# Patient Record
Sex: FEMALE | Race: WHITE | HISPANIC OR LATINO
[De-identification: names, ages, dates, MRNs, and addresses within clinical notes are randomized per-mention and may not be internally consistent; named-entity substitution may affect disease eponyms.]

---

## 2017-02-17 ENCOUNTER — APPOINTMENT (OUTPATIENT)
Dept: PULMONOLOGY | Facility: CLINIC | Age: 62
End: 2017-02-17

## 2017-03-20 ENCOUNTER — APPOINTMENT (OUTPATIENT)
Dept: PULMONOLOGY | Facility: CLINIC | Age: 62
End: 2017-03-20

## 2017-09-21 ENCOUNTER — APPOINTMENT (OUTPATIENT)
Dept: PULMONOLOGY | Facility: CLINIC | Age: 62
End: 2017-09-21
Payer: COMMERCIAL

## 2017-09-21 PROCEDURE — G0008: CPT

## 2017-09-21 PROCEDURE — 90670 PCV13 VACCINE IM: CPT

## 2017-09-21 PROCEDURE — 90472 IMMUNIZATION ADMIN EACH ADD: CPT

## 2017-09-21 PROCEDURE — 99214 OFFICE O/P EST MOD 30 MIN: CPT | Mod: 25

## 2018-02-02 ENCOUNTER — APPOINTMENT (OUTPATIENT)
Dept: PULMONOLOGY | Facility: CLINIC | Age: 63
End: 2018-02-02
Payer: COMMERCIAL

## 2018-02-02 PROCEDURE — 99213 OFFICE O/P EST LOW 20 MIN: CPT | Mod: 25

## 2018-04-24 ENCOUNTER — APPOINTMENT (OUTPATIENT)
Dept: PULMONOLOGY | Facility: CLINIC | Age: 63
End: 2018-04-24
Payer: COMMERCIAL

## 2018-04-24 PROCEDURE — 99214 OFFICE O/P EST MOD 30 MIN: CPT | Mod: 25

## 2018-05-03 ENCOUNTER — APPOINTMENT (OUTPATIENT)
Dept: PULMONOLOGY | Facility: CLINIC | Age: 63
End: 2018-05-03
Payer: COMMERCIAL

## 2018-05-03 PROCEDURE — 94010 BREATHING CAPACITY TEST: CPT

## 2018-05-03 PROCEDURE — 94729 DIFFUSING CAPACITY: CPT

## 2018-05-03 PROCEDURE — 94727 GAS DIL/WSHOT DETER LNG VOL: CPT

## 2018-09-13 ENCOUNTER — APPOINTMENT (OUTPATIENT)
Dept: PULMONOLOGY | Facility: CLINIC | Age: 63
End: 2018-09-13
Payer: COMMERCIAL

## 2018-09-13 VITALS
SYSTOLIC BLOOD PRESSURE: 110 MMHG | DIASTOLIC BLOOD PRESSURE: 70 MMHG | HEART RATE: 76 BPM | TEMPERATURE: 98.2 F | OXYGEN SATURATION: 98 % | HEIGHT: 61 IN | WEIGHT: 117 LBS | BODY MASS INDEX: 22.09 KG/M2

## 2018-09-13 DIAGNOSIS — Z86.018 PERSONAL HISTORY OF OTHER BENIGN NEOPLASM: ICD-10-CM

## 2018-09-13 DIAGNOSIS — Z87.01 PERSONAL HISTORY OF PNEUMONIA (RECURRENT): ICD-10-CM

## 2018-09-13 DIAGNOSIS — Z80.3 FAMILY HISTORY OF MALIGNANT NEOPLASM OF BREAST: ICD-10-CM

## 2018-09-13 DIAGNOSIS — Z80.1 FAMILY HISTORY OF MALIGNANT NEOPLASM OF TRACHEA, BRONCHUS AND LUNG: ICD-10-CM

## 2018-09-13 DIAGNOSIS — Z78.9 OTHER SPECIFIED HEALTH STATUS: ICD-10-CM

## 2018-09-13 DIAGNOSIS — Z80.8 FAMILY HISTORY OF MALIGNANT NEOPLASM OF OTHER ORGANS OR SYSTEMS: ICD-10-CM

## 2018-09-13 DIAGNOSIS — Z82.3 FAMILY HISTORY OF STROKE: ICD-10-CM

## 2018-09-13 DIAGNOSIS — Z82.49 FAMILY HISTORY OF ISCHEMIC HEART DISEASE AND OTHER DISEASES OF THE CIRCULATORY SYSTEM: ICD-10-CM

## 2018-09-13 PROCEDURE — 99214 OFFICE O/P EST MOD 30 MIN: CPT

## 2018-09-25 ENCOUNTER — APPOINTMENT (OUTPATIENT)
Dept: CT IMAGING | Facility: HOSPITAL | Age: 63
End: 2018-09-25
Payer: COMMERCIAL

## 2018-09-25 ENCOUNTER — OUTPATIENT (OUTPATIENT)
Dept: OUTPATIENT SERVICES | Facility: HOSPITAL | Age: 63
LOS: 1 days | End: 2018-09-25
Payer: COMMERCIAL

## 2018-09-25 PROCEDURE — 71250 CT THORAX DX C-: CPT | Mod: 26

## 2018-09-25 PROCEDURE — 71250 CT THORAX DX C-: CPT

## 2018-10-31 ENCOUNTER — APPOINTMENT (OUTPATIENT)
Dept: PULMONOLOGY | Facility: CLINIC | Age: 63
End: 2018-10-31
Payer: COMMERCIAL

## 2018-10-31 VITALS
WEIGHT: 118 LBS | OXYGEN SATURATION: 98 % | HEART RATE: 73 BPM | SYSTOLIC BLOOD PRESSURE: 120 MMHG | DIASTOLIC BLOOD PRESSURE: 84 MMHG | BODY MASS INDEX: 22.28 KG/M2 | HEIGHT: 61 IN | TEMPERATURE: 98.6 F

## 2018-10-31 PROCEDURE — 99214 OFFICE O/P EST MOD 30 MIN: CPT

## 2018-11-30 ENCOUNTER — APPOINTMENT (OUTPATIENT)
Dept: PULMONOLOGY | Facility: CLINIC | Age: 63
End: 2018-11-30
Payer: COMMERCIAL

## 2018-11-30 PROCEDURE — 94729 DIFFUSING CAPACITY: CPT

## 2018-11-30 PROCEDURE — 94010 BREATHING CAPACITY TEST: CPT

## 2019-05-08 ENCOUNTER — APPOINTMENT (OUTPATIENT)
Dept: PULMONOLOGY | Facility: CLINIC | Age: 64
End: 2019-05-08
Payer: COMMERCIAL

## 2019-05-08 VITALS
DIASTOLIC BLOOD PRESSURE: 70 MMHG | BODY MASS INDEX: 22.09 KG/M2 | WEIGHT: 117 LBS | TEMPERATURE: 98.1 F | HEIGHT: 61 IN | OXYGEN SATURATION: 98 % | HEART RATE: 78 BPM | SYSTOLIC BLOOD PRESSURE: 102 MMHG

## 2019-05-08 PROCEDURE — 99214 OFFICE O/P EST MOD 30 MIN: CPT

## 2019-05-08 NOTE — HISTORY OF PRESENT ILLNESS
[Wheezing] : denies wheezing [Fever] : denies fever [Regional Soft Tissue Swelling Both Lower Extremities] : denies lower extremity edema [Chest Pain Or Discomfort] : denies chest pain [Class I - No Symptoms and No Limitations] : I [3  -  Moderate] : 3, moderate [Witnessed Apneas] : witnessed sleep apnea [Stable] : are stable [Difficulty Breathing During Exertion] : stable dyspnea on exertion [Feelings Of Weakness On Exertion] : stable exercise intolerance [Cough] : stable coughing [Snoring] : Snoring [Daytime Somnolence] : daytime somnolence [Unintentional Sleep while Active] : unintentional sleep while active [Unintentional Sleep While Inactive] : unintentional sleep while inactive [Wt Gain ___ Lbs] : no recent weight gain [Wt Loss ___ Lbs] : no recent weight loss [Oxygen] : the patient uses no supplemental oxygen [FreeTextEntry1] : She has complaints of fatigue over a month. She continue to cough and with intermittent productive intermittent cough with some green sputum at times.  She continues to have SOB intermittently and fatigued.  Mostly SOB with climbing stairs and ok with doing cardio at the gym. She has been exhausted and sleeping more than normal.  She is not coughing at night any more which is keeping up.  Gets 7 - 3 hours a night of sleep.  Her hair is falling out more.  \par \par Continues with post nasal gtt and sinus congestion. She was seen by ENT was told she had infection was given antibiotics. Seen by Dr. Queen on [address] [place] [address] and had follow up which cleared infection. Denies any fever.  States her  stays that she tends to hold hold her breath at night and snores at nighttime.  She is waking herself up snoring.\par \par She was sen by PCP had full blood work which is normal.

## 2019-05-08 NOTE — REVIEW OF SYSTEMS
[Nasal Congestion] : nasal congestion [Postnasal Drip] : postnasal drip [Cough] : cough [Dyspnea] : dyspnea [Negative] : Pulmonary Hypertension

## 2019-05-08 NOTE — REASON FOR VISIT
[Follow-Up] : a follow-up visit [Shortness of Breath] : shortness of Breath [FreeTextEntry2] : Follow up

## 2019-05-08 NOTE — PHYSICAL EXAM
[General Appearance - Well Developed] : well developed [Normal Appearance] : normal appearance [Well Groomed] : well groomed [No Deformities] : no deformities [General Appearance - Well Nourished] : well nourished [General Appearance - In No Acute Distress] : no acute distress [Normal Conjunctiva] : the conjunctiva exhibited no abnormalities [Eyelids - No Xanthelasma] : the eyelids demonstrated no xanthelasmas [Normal Oropharynx] : normal oropharynx [Neck Appearance] : the appearance of the neck was normal [Neck Cervical Mass (___cm)] : no neck mass was observed [Jugular Venous Distention Increased] : there was no jugular-venous distention [Thyroid Diffuse Enlargement] : the thyroid was not enlarged [Thyroid Nodule] : there were no palpable thyroid nodules [Heart Rate And Rhythm] : heart rate and rhythm were normal [Heart Sounds] : normal S1 and S2 [Murmurs] : no murmurs present [Respiration, Rhythm And Depth] : normal respiratory rhythm and effort [Exaggerated Use Of Accessory Muscles For Inspiration] : no accessory muscle use [Auscultation Breath Sounds / Voice Sounds] : lungs were clear to auscultation bilaterally [Abdomen Soft] : soft [Abdomen Tenderness] : non-tender [Abdomen Mass (___ Cm)] : no abdominal mass palpated [Abnormal Walk] : normal gait [Gait - Sufficient For Exercise Testing] : the gait was sufficient for exercise testing [Nail Clubbing] : no clubbing of the fingernails [Cyanosis, Localized] : no localized cyanosis [Petechial Hemorrhages (___cm)] : no petechial hemorrhages [Skin Color & Pigmentation] : normal skin color and pigmentation [] : no rash [No Venous Stasis] : no venous stasis [Skin Lesions] : no skin lesions [No Skin Ulcers] : no skin ulcer [No Xanthoma] : no  xanthoma was observed [Deep Tendon Reflexes (DTR)] : deep tendon reflexes were 2+ and symmetric [Sensation] : the sensory exam was normal to light touch and pinprick [No Focal Deficits] : no focal deficits

## 2019-05-08 NOTE — ASSESSMENT
[FreeTextEntry1] : Pulmonary nodules\par \par I discussed the last CT scan from march 2018 and with negative PET scan. The nodules are more of tree in bud consistent with airway disease and bronchiectasis, with mild increase since prior imaging.  She is high risk for lung cancer due to smoking history (Quit 25 years ago, 15 years 0.5 ppd)  and family history of lung CA. Follow up CT scan in one year Sept 2019.\par \par bronchiectasis \par \par Problem related to her previous infection. There is no evidence of exacerbation at this time.  Discussed Bronchiectasis in detail with pt, she is asymptomatic at this time no worsening cough, appetite is good and recently been gaining weight.  No need for bronch at this time or treatment.\par I advised the patient the need for immunization and she will follow her primary physician. She is to exercise to improve her lung condition.  Lung exam unremarkable and oxygen saturation WNL.  Follow on repeat CT scan.  She is due for her PNA 23 and Flu she will get with her PCP. \par \par Post nasal gtt \par \par Continue with saline spray as needed.  Cough most likely related to post nasal gtt at this time.\par \par snoring/fatigue\par \par I discussed the short and long term health effect of the obstructive seep apnea with the patient. These effects include, but not limited to, uncontrolled hypertension, CAD, arrhythmias, sudden death, CVA, and pulmonary hypertension. I advised the patient to avoid sedatives, narcotics, driving, and sleeping pills in the meantime. I discussed the therapeutic options including but not limited to CPAP, surgery, and oral appliance. Further recommendations will follow after sleep study.\par

## 2019-05-21 ENCOUNTER — OTHER (OUTPATIENT)
Age: 64
End: 2019-05-21

## 2019-05-29 ENCOUNTER — OUTPATIENT (OUTPATIENT)
Dept: OUTPATIENT SERVICES | Facility: HOSPITAL | Age: 64
LOS: 1 days | End: 2019-05-29
Payer: COMMERCIAL

## 2019-05-29 ENCOUNTER — APPOINTMENT (OUTPATIENT)
Dept: SLEEP CENTER | Facility: HOME HEALTH | Age: 64
End: 2019-05-29
Payer: COMMERCIAL

## 2019-05-29 PROCEDURE — 95800 SLP STDY UNATTENDED: CPT | Mod: 26

## 2019-05-29 PROCEDURE — 95800 SLP STDY UNATTENDED: CPT

## 2019-05-30 DIAGNOSIS — G47.33 OBSTRUCTIVE SLEEP APNEA (ADULT) (PEDIATRIC): ICD-10-CM

## 2019-08-08 ENCOUNTER — APPOINTMENT (OUTPATIENT)
Dept: PULMONOLOGY | Facility: CLINIC | Age: 64
End: 2019-08-08
Payer: COMMERCIAL

## 2019-08-08 VITALS
SYSTOLIC BLOOD PRESSURE: 100 MMHG | OXYGEN SATURATION: 98 % | DIASTOLIC BLOOD PRESSURE: 70 MMHG | HEART RATE: 79 BPM | BODY MASS INDEX: 21.9 KG/M2 | HEIGHT: 61 IN | RESPIRATION RATE: 12 BRPM | TEMPERATURE: 99.1 F | WEIGHT: 116 LBS

## 2019-08-08 PROCEDURE — 99214 OFFICE O/P EST MOD 30 MIN: CPT

## 2019-08-08 NOTE — HISTORY OF PRESENT ILLNESS
[Stable] : are stable [Difficulty Breathing During Exertion] : stable dyspnea on exertion [Feelings Of Weakness On Exertion] : stable exercise intolerance [Cough] : stable coughing [Regional Soft Tissue Swelling Both Lower Extremities] : denies lower extremity edema [Wheezing] : denies wheezing [Chest Pain Or Discomfort] : denies chest pain [Fever] : denies fever [3  -  Moderate] : 3, moderate [Class I - No Symptoms and No Limitations] : I [Snoring] : snoring [Witnessed Apneas] : witnessed sleep apnea [Daytime Somnolence] : daytime somnolence [Unintentional Sleep while Active] : unintentional sleep while active [Unintentional Sleep While Inactive] : unintentional sleep while inactive [Wt Gain ___ Lbs] : no recent weight gain [Wt Loss ___ Lbs] : no recent weight loss [Oxygen] : the patient uses no supplemental oxygen [FreeTextEntry1] : 5/29/2019\par \par She has complaints of fatigue over a month. She continue to cough and with intermittent productive intermittent cough with some green sputum at times.  She continues to have SOB intermittently and fatigued.  Mostly SOB with climbing stairs and ok with doing cardio at the gym. She has been exhausted and sleeping more than normal.  She is not coughing at night any more which is keeping up.  Gets 7 - 3 hours a night of sleep.  Her hair is falling out more.  \par \par Continues with post nasal gtt and sinus congestion. She was seen by ENT was told she had infection was given antibiotics. Seen by Dr. Queen on [address] [place] [address] and had follow up which cleared infection. Denies any fever.  States her  stays that she tends to hold hold her breath at night and snores at nighttime.  She is waking herself up snoring.\par \par She was sen by PCP had full blood work which is normal.\par \par 8/8/2019\par \par Pt is concerned regarding her CT scan changes.  About 4 weeks ago she was sleeping and she woke up gagging with hemoptysis.  She was coughing up a lot of bright red blood about the size of a tablespoon.  This is the first time she has ever coughed up any blood.  \par \par Otherwise she has had intermittent SOB that has not gotten any worse.  She coughing worse at night when lying down and green sputum last time.  Denies fevers, weight loss or change in appetite. \par \par She had her air tested in her apartment.

## 2019-08-08 NOTE — DISCUSSION/SUMMARY
[FreeTextEntry1] : \par EXAM: CT CHEST \par \par PROCEDURE DATE: 09/25/2018 \par \par \par \par INTERPRETATION: CT Chest without intravenous contrast \par \par INDICATION: Bronchiectasis with increased cough. Tree-in-bud pulmonary \par nodules. \par \par Technique: CT scan of chest performed from lung apices through lung bases. 1 \par mm thick axial images, axial MIPS, and sagittal and coronal reformatted \par images were produced. Intravenous contrast was not administered, as ordered. \par \par COMPARISON: PET/CT 8/12/2016. \par \par FINDINGS: \par \par Lungs and large airways: There has been mild interval progression in \par bronchiectasis, atelectasis, tree-in-bud nodules and mucus plugging in the \par lingula. Similar but less advanced findings are present in the remaining \par lobes. Findings are most consistent with small and large airway \par inflammation, likely infectious such as DEDRICK. There is mild biapical pleural \par parenchymal scarring. \par \par Lymph nodes: No thoracic lymphadenopathy. \par \par Mediastinum: Heart size is normal. No pericardial effusion. \par \par Vessels: Small coronary plaque. \par \par Chest wall and lower neck: Normal. \par \par Upper abdomen: Unremarkable. \par \par Bones: Mild degenerative changes. \par \par \par IMPRESSION: \par \par \par 1. Mild interval progression in lingular bronchiectasis, tree-in-bud \par nodules, mucous plugging and atelectasis/scarring. \par 2. Similar but less advanced findings are present in the remaining lobes. \par Treated differential diagnosis includes pulmonary nontuberculous \par mycobacterial infection. Marked discopathy correlation. \par \par \par

## 2019-08-08 NOTE — ASSESSMENT
[FreeTextEntry1] : Pulmonary nodules\par \par I discussed the last CT scan from march 2018 and with negative PET scan. The nodules are more of tree in bud consistent with airway disease and bronchiectasis, with mild increase since prior imaging.  She is high risk for lung cancer due to smoking history (Quit 25 years ago, 15 years 0.5 ppd)  and family history of lung CA. Follow up CT scan at this time due to hemoptysis and evaluate nodules\par \par bronchiectasis \par \par Problem related to her previous infection.Discussed Bronchiectasis in detail with pt, she is asymptomatic at this time no worsening cough, appetite is good and recently been gaining weight.  Pt with hemoptysis follow with CT scan at this time and evaluate for possible need for bronch at this time or treatment.\par \par I advised the patient the need for immunization and she will follow her primary physician. She is to exercise to improve her lung condition.  Lung exam unremarkable and oxygen saturation WNL.  Follow on repeat CT scan.  She is due for her PNA 23 and Flu she will get with her PCP. \par \par Post nasal gtt \par \par Continue with saline spray as needed.   She is followed by ENT Dr. Queen on [address]. \par \par snoring/fatigue\par \par Reviewed the sleep study without any significant sleep disorder was observed by PHI criteria.\par

## 2019-08-08 NOTE — PHYSICAL EXAM
[Normal Appearance] : normal appearance [General Appearance - Well Developed] : well developed [Well Groomed] : well groomed [General Appearance - Well Nourished] : well nourished [No Deformities] : no deformities [General Appearance - In No Acute Distress] : no acute distress [Normal Conjunctiva] : the conjunctiva exhibited no abnormalities [Eyelids - No Xanthelasma] : the eyelids demonstrated no xanthelasmas [Normal Oropharynx] : normal oropharynx [Neck Appearance] : the appearance of the neck was normal [Jugular Venous Distention Increased] : there was no jugular-venous distention [Neck Cervical Mass (___cm)] : no neck mass was observed [Thyroid Nodule] : there were no palpable thyroid nodules [Thyroid Diffuse Enlargement] : the thyroid was not enlarged [Heart Rate And Rhythm] : heart rate and rhythm were normal [Murmurs] : no murmurs present [Heart Sounds] : normal S1 and S2 [Exaggerated Use Of Accessory Muscles For Inspiration] : no accessory muscle use [Respiration, Rhythm And Depth] : normal respiratory rhythm and effort [Auscultation Breath Sounds / Voice Sounds] : lungs were clear to auscultation bilaterally [Abdomen Tenderness] : non-tender [Abdomen Soft] : soft [Abdomen Mass (___ Cm)] : no abdominal mass palpated [Abnormal Walk] : normal gait [Gait - Sufficient For Exercise Testing] : the gait was sufficient for exercise testing [Nail Clubbing] : no clubbing of the fingernails [Cyanosis, Localized] : no localized cyanosis [Petechial Hemorrhages (___cm)] : no petechial hemorrhages [] : no ischemic changes [Skin Color & Pigmentation] : normal skin color and pigmentation [Skin Lesions] : no skin lesions [No Venous Stasis] : no venous stasis [No Skin Ulcers] : no skin ulcer [No Xanthoma] : no  xanthoma was observed [Deep Tendon Reflexes (DTR)] : deep tendon reflexes were 2+ and symmetric [Sensation] : the sensory exam was normal to light touch and pinprick [No Focal Deficits] : no focal deficits

## 2019-08-08 NOTE — REVIEW OF SYSTEMS
[Postnasal Drip] : postnasal drip [Nasal Congestion] : nasal congestion [Cough] : cough [Dyspnea] : dyspnea [Negative] : Sleep Disorder

## 2019-09-10 ENCOUNTER — FORM ENCOUNTER (OUTPATIENT)
Age: 64
End: 2019-09-10

## 2019-09-11 ENCOUNTER — OUTPATIENT (OUTPATIENT)
Dept: OUTPATIENT SERVICES | Facility: HOSPITAL | Age: 64
LOS: 1 days | End: 2019-09-11
Payer: COMMERCIAL

## 2019-09-11 ENCOUNTER — APPOINTMENT (OUTPATIENT)
Dept: CT IMAGING | Facility: HOSPITAL | Age: 64
End: 2019-09-11
Payer: COMMERCIAL

## 2019-09-11 PROCEDURE — 71250 CT THORAX DX C-: CPT

## 2019-09-11 PROCEDURE — 71250 CT THORAX DX C-: CPT | Mod: 26

## 2019-09-24 ENCOUNTER — MED ADMIN CHARGE (OUTPATIENT)
Age: 64
End: 2019-09-24

## 2019-09-24 ENCOUNTER — APPOINTMENT (OUTPATIENT)
Dept: PULMONOLOGY | Facility: CLINIC | Age: 64
End: 2019-09-24
Payer: COMMERCIAL

## 2019-09-24 VITALS
OXYGEN SATURATION: 97 % | WEIGHT: 115 LBS | BODY MASS INDEX: 21.71 KG/M2 | SYSTOLIC BLOOD PRESSURE: 116 MMHG | TEMPERATURE: 98 F | HEIGHT: 61 IN | DIASTOLIC BLOOD PRESSURE: 80 MMHG | HEART RATE: 74 BPM

## 2019-09-24 PROCEDURE — 90686 IIV4 VACC NO PRSV 0.5 ML IM: CPT

## 2019-09-24 PROCEDURE — 99214 OFFICE O/P EST MOD 30 MIN: CPT | Mod: 25

## 2019-09-24 PROCEDURE — G0008: CPT

## 2019-09-24 RX ORDER — MUCUS CLEARING DEVICE
EACH MISCELLANEOUS
Qty: 1 | Refills: 0 | Status: ACTIVE | COMMUNITY
Start: 2019-09-24 | End: 1900-01-01

## 2019-09-24 NOTE — REASON FOR VISIT
[Follow-Up] : a follow-up visit [FreeTextEntry2] : Follow up  [Shortness of Breath] : shortness of Breath

## 2019-09-24 NOTE — HISTORY OF PRESENT ILLNESS
[Stable] : are stable [Feelings Of Weakness On Exertion] : stable exercise intolerance [Difficulty Breathing During Exertion] : stable dyspnea on exertion [Wheezing] : denies wheezing [Cough] : stable coughing [Regional Soft Tissue Swelling Both Lower Extremities] : denies lower extremity edema [Chest Pain Or Discomfort] : denies chest pain [Fever] : denies fever [Wt Gain ___ Lbs] : no recent weight gain [Wt Loss ___ Lbs] : no recent weight loss [Oxygen] : the patient uses no supplemental oxygen [3  -  Moderate] : 3, moderate [Class I - No Symptoms and No Limitations] : I [Witnessed Apneas] : witnessed sleep apnea [Snoring] : snoring [Daytime Somnolence] : daytime somnolence [Unintentional Sleep while Active] : unintentional sleep while active [Unintentional Sleep While Inactive] : unintentional sleep while inactive [FreeTextEntry1] : she is following on the CT scan of chest and she is doing well.  No wheezing

## 2019-09-24 NOTE — PHYSICAL EXAM
[General Appearance - Well Developed] : well developed [Normal Appearance] : normal appearance [Well Groomed] : well groomed [General Appearance - Well Nourished] : well nourished [No Deformities] : no deformities [Normal Conjunctiva] : the conjunctiva exhibited no abnormalities [General Appearance - In No Acute Distress] : no acute distress [Eyelids - No Xanthelasma] : the eyelids demonstrated no xanthelasmas [Neck Appearance] : the appearance of the neck was normal [Normal Oropharynx] : normal oropharynx [Neck Cervical Mass (___cm)] : no neck mass was observed [Jugular Venous Distention Increased] : there was no jugular-venous distention [Thyroid Diffuse Enlargement] : the thyroid was not enlarged [Thyroid Nodule] : there were no palpable thyroid nodules [Heart Sounds] : normal S1 and S2 [Heart Rate And Rhythm] : heart rate and rhythm were normal [Murmurs] : no murmurs present [Exaggerated Use Of Accessory Muscles For Inspiration] : no accessory muscle use [Respiration, Rhythm And Depth] : normal respiratory rhythm and effort [Auscultation Breath Sounds / Voice Sounds] : lungs were clear to auscultation bilaterally [Abdomen Soft] : soft [Abdomen Tenderness] : non-tender [Abnormal Walk] : normal gait [Abdomen Mass (___ Cm)] : no abdominal mass palpated [Nail Clubbing] : no clubbing of the fingernails [Gait - Sufficient For Exercise Testing] : the gait was sufficient for exercise testing [Cyanosis, Localized] : no localized cyanosis [Petechial Hemorrhages (___cm)] : no petechial hemorrhages [Skin Color & Pigmentation] : normal skin color and pigmentation [] : no rash [No Venous Stasis] : no venous stasis [Skin Lesions] : no skin lesions [No Xanthoma] : no  xanthoma was observed [No Skin Ulcers] : no skin ulcer [Deep Tendon Reflexes (DTR)] : deep tendon reflexes were 2+ and symmetric [Sensation] : the sensory exam was normal to light touch and pinprick [No Focal Deficits] : no focal deficits

## 2019-09-24 NOTE — ASSESSMENT
[FreeTextEntry1] : Pulmonary nodules\par \par I discussed the case in details with the patient. I reviewed the images with her. There is new wedge-shaped infiltrate in the korey left upper lobe. The picture is more consistent with airway disease rather than neoplastic process. The patient has no evidence of thromboembolic disease. Patient has a symptomatic at this point. Recommendation is to follow with repeat CT scan of the chest in 3 months. I instructed the patient to avoid dehydration and encouraged expectoration of her sputum. Patient was started on Aerobica\par \par bronchiectasis \par \par Problem related to her previous infection.Discussed Bronchiectasis in detail with pt, she is asymptomatic at this time no worsening cough, appetite is good and recently been gaining weight.  Pt with hemoptysis follow with CT scan at this time and evaluate for possible need for bronch at this time or treatment.\par \par I advised the patient the need for immunization and she will follow her primary physician. She is to exercise to improve her lung condition.  Lung exam unremarkable and oxygen saturation WNL.  Follow on repeat CT scan.  She is due for her PNA 23 and Flu she will get with her PCP. \par \par Post nasal gtt \par \par Continue with saline spray as needed.   She is followed by ENT Dr. Queen on [address]. \par \par snoring/fatigue\par \par Reviewed the sleep study without any significant sleep disorder was observed by PHI criteria.\par \par Flu vaccine given in the left deltoid area and tolerated well

## 2019-12-17 ENCOUNTER — APPOINTMENT (OUTPATIENT)
Dept: PULMONOLOGY | Facility: CLINIC | Age: 64
End: 2019-12-17
Payer: COMMERCIAL

## 2019-12-17 VITALS
TEMPERATURE: 98.1 F | DIASTOLIC BLOOD PRESSURE: 64 MMHG | SYSTOLIC BLOOD PRESSURE: 110 MMHG | OXYGEN SATURATION: 98 % | BODY MASS INDEX: 21.9 KG/M2 | WEIGHT: 116 LBS | HEIGHT: 61 IN | HEART RATE: 76 BPM

## 2019-12-17 PROCEDURE — 99214 OFFICE O/P EST MOD 30 MIN: CPT

## 2019-12-17 NOTE — HISTORY OF PRESENT ILLNESS
[Stable] : are stable [Difficulty Breathing During Exertion] : stable dyspnea on exertion [Feelings Of Weakness On Exertion] : stable exercise intolerance [Cough] : stable coughing [Wheezing] : denies wheezing [Regional Soft Tissue Swelling Both Lower Extremities] : denies lower extremity edema [Chest Pain Or Discomfort] : denies chest pain [Fever] : denies fever [3  -  Moderate] : 3, moderate [Class I - No Symptoms and No Limitations] : I [Snoring] : snoring [Witnessed Apneas] : witnessed sleep apnea [Daytime Somnolence] : daytime somnolence [Unintentional Sleep While Inactive] : unintentional sleep while inactive [Unintentional Sleep while Active] : unintentional sleep while active [Wt Gain ___ Lbs] : no recent weight gain [Wt Loss ___ Lbs] : no recent weight loss [Oxygen] : the patient uses no supplemental oxygen [FreeTextEntry1] : She is here for follow up. She had a bad cold and sinus infection up until last weekend.  She want to ENT last week was told had bacterial sinus infection.  She was given cefdinir and medrol dose pack.  She just started Medrol dose pack today and finishing up the antibiotics.  She had blisters in her nose and outside her nose.  She feels improvement with the steroids and antibiotics.  Mucus is becoming out clear now without any bloody noses.   She as a little  SOB with this infection.  However she is walking 3 miles the other day.  She denies any fever. \par \par she is concerned about CT scan follow up on her condition. She feels fatigued more often, sleeps 10 pm to 6 am.  she is sleeping well.  Denies any hemoptysis or  ER visit.  Her appetite is good and not loosing weight. \par \par She has a humidifier, air purifier, steaming, expectorant and using aerobika as needed.

## 2019-12-17 NOTE — PHYSICAL EXAM
[Normal Appearance] : normal appearance [General Appearance - Well Developed] : well developed [Well Groomed] : well groomed [No Deformities] : no deformities [General Appearance - Well Nourished] : well nourished [General Appearance - In No Acute Distress] : no acute distress [Eyelids - No Xanthelasma] : the eyelids demonstrated no xanthelasmas [Normal Conjunctiva] : the conjunctiva exhibited no abnormalities [Normal Oropharynx] : normal oropharynx [Neck Cervical Mass (___cm)] : no neck mass was observed [Neck Appearance] : the appearance of the neck was normal [Thyroid Diffuse Enlargement] : the thyroid was not enlarged [Jugular Venous Distention Increased] : there was no jugular-venous distention [Thyroid Nodule] : there were no palpable thyroid nodules [Heart Rate And Rhythm] : heart rate and rhythm were normal [Heart Sounds] : normal S1 and S2 [Murmurs] : no murmurs present [Respiration, Rhythm And Depth] : normal respiratory rhythm and effort [Exaggerated Use Of Accessory Muscles For Inspiration] : no accessory muscle use [Abdomen Soft] : soft [Auscultation Breath Sounds / Voice Sounds] : lungs were clear to auscultation bilaterally [Abdomen Tenderness] : non-tender [Abdomen Mass (___ Cm)] : no abdominal mass palpated [Abnormal Walk] : normal gait [Gait - Sufficient For Exercise Testing] : the gait was sufficient for exercise testing [Nail Clubbing] : no clubbing of the fingernails [Cyanosis, Localized] : no localized cyanosis [Petechial Hemorrhages (___cm)] : no petechial hemorrhages [] : no rash [Skin Color & Pigmentation] : normal skin color and pigmentation [No Venous Stasis] : no venous stasis [No Skin Ulcers] : no skin ulcer [Skin Lesions] : no skin lesions [No Xanthoma] : no  xanthoma was observed [Deep Tendon Reflexes (DTR)] : deep tendon reflexes were 2+ and symmetric [No Focal Deficits] : no focal deficits [Sensation] : the sensory exam was normal to light touch and pinprick [Erythema] : erythema of the pharynx [FreeTextEntry1] : congestion to right nasal

## 2019-12-17 NOTE — ASSESSMENT
[FreeTextEntry1] : Pulmonary nodules\par \par I discussed the case in details with the patient. I reviewed the images with her. There is new wedge-shaped infiltrate in the korey left upper lobe. The picture is more consistent with airway disease rather than neoplastic process. The patient has no evidence of thromboembolic disease. Patient is not symptomatic at this point. Recommendation is to follow with repeat CT scan of the chest at this time. I instructed the patient to avoid dehydration and encouraged expectoration of her sputum. Patient to continue on Aeriobika.  We discussed follow up with CT scan at this time and pt in agreement.  She is to go for CT scan once her Sinus infection is cleared.  Continue on antibiotics and medrol dose pack.\par \par bronchiectasis \par \par Problem related to her previous infection. Discussed Bronchiectasis in detail with pt, she is asymptomatic at this time no worsening cough, appetite is good and recently been gaining weight.  Pt without any episodes of hemoptysis follow with CT scan at this time and evaluate for possible need for bronch at this time.\par \par I advised the patient the need for immunization.  She is to exercise to improve her lung condition.  Lung exam unremarkable and oxygen saturation WNL.  Follow on repeat CT scan.  She is due for her PNA 23 and Flu up to date.  To given her the PNA 23 next visit once she feels better. \par \par Post nasal gtt \par \par Continue with saline spray as needed.   She is followed by ENT Dr. Queen on [address].  Finish antibiotics and steroids. \par \par snoring/fatigue\par \par Reviewed the sleep study without any significant sleep disorder was observed by PHI criteria.\par \par

## 2020-01-22 ENCOUNTER — FORM ENCOUNTER (OUTPATIENT)
Age: 65
End: 2020-01-22

## 2020-01-23 ENCOUNTER — APPOINTMENT (OUTPATIENT)
Dept: CT IMAGING | Facility: HOSPITAL | Age: 65
End: 2020-01-23
Payer: COMMERCIAL

## 2020-01-23 ENCOUNTER — OUTPATIENT (OUTPATIENT)
Dept: OUTPATIENT SERVICES | Facility: HOSPITAL | Age: 65
LOS: 1 days | End: 2020-01-23
Payer: COMMERCIAL

## 2020-01-23 PROCEDURE — 71250 CT THORAX DX C-: CPT | Mod: 26

## 2020-01-23 PROCEDURE — 71250 CT THORAX DX C-: CPT

## 2020-04-09 NOTE — HISTORY OF PRESENT ILLNESS
[FreeTextEntry1] : pt has been having for the past 3 day a burning sensation in her throat with heat.  Does not feel sore throat.  She has had difficulty breathing It went down to her chest and she is now having difficulty breathing in chest.  Today the breathing is better in the chest. She has cough that is no difficult then usual. She is not having fevers.  I provided her with the number for  [employer] to be tested for COVID and possible streph.  My concern is her upper airway and if she needs steroids. Pt will update me if she gets an appt. with [facility].

## 2020-04-12 ENCOUNTER — APPOINTMENT (OUTPATIENT)
Dept: DISASTER EMERGENCY | Facility: CLINIC | Age: 65
End: 2020-04-12
Payer: COMMERCIAL

## 2020-04-12 VITALS
DIASTOLIC BLOOD PRESSURE: 82 MMHG | OXYGEN SATURATION: 97 % | TEMPERATURE: 98.2 F | SYSTOLIC BLOOD PRESSURE: 128 MMHG | HEART RATE: 81 BPM | RESPIRATION RATE: 18 BRPM

## 2020-04-12 DIAGNOSIS — R68.89 OTHER GENERAL SYMPTOMS AND SIGNS: ICD-10-CM

## 2020-04-12 PROCEDURE — 99202 OFFICE O/P NEW SF 15 MIN: CPT

## 2020-04-12 NOTE — HISTORY OF PRESENT ILLNESS
[] : no dizziness on standing [None Known] : none known [Lung Disease] : lung disease [None] : none [Clear] : clear [Good Air Entry] : good air entry [Speaks in full sentences] : speaks in full sentences [Normal O2 sat at rest] : normal O2 sat at rest [Grossly normal, interacts, not tired or weak] : grossly normal, interacts, not tired or weak [COVID-19 testing ordered and specimen obtained] : COVID-19 testing ordered and specimen obtained [Patient presents to the office today for COVID-19 evaluation and testing.] : Patient presents to the office today for COVID-19 evaluation and testing. [FreeTextEntry1] : Dry cough x 1 week, SOB, chest closing, heat in my chest. She denies other symptoms\par H/O Bronchiectasis, COPD [Age >= 60 years] : age >= 60 years [TextBox_107] : Explained to patient that the test is uncomfortable\par Advised patient to self quarantine if any fever, to remain there until she has no fever x 3 days without anti-fever medication. Then practice social distancing at all times

## 2020-04-13 LAB — SARS-COV-2 N GENE NPH QL NAA+PROBE: NOT DETECTED

## 2020-07-30 ENCOUNTER — APPOINTMENT (OUTPATIENT)
Dept: PULMONOLOGY | Facility: CLINIC | Age: 65
End: 2020-07-30
Payer: COMMERCIAL

## 2020-07-30 VITALS
OXYGEN SATURATION: 97 % | HEART RATE: 90 BPM | TEMPERATURE: 98.2 F | BODY MASS INDEX: 21.71 KG/M2 | HEIGHT: 61 IN | SYSTOLIC BLOOD PRESSURE: 110 MMHG | RESPIRATION RATE: 12 BRPM | WEIGHT: 115 LBS | DIASTOLIC BLOOD PRESSURE: 80 MMHG

## 2020-07-30 PROCEDURE — 99214 OFFICE O/P EST MOD 30 MIN: CPT

## 2020-07-30 NOTE — REVIEW OF SYSTEMS
[Nasal Congestion] : nasal congestion [Postnasal Drip] : postnasal drip [Dyspnea] : dyspnea [Negative] : Sleep Disorder [Cough] : no cough

## 2020-07-30 NOTE — ASSESSMENT
[FreeTextEntry1] : Pulmonary nodules\par \par I discussed the case in details with the patient. I reviewed last CT scan from 1/2020 findings compatible with small large airway inflammation. Interval decrease in size of the subpleural.  No new pulmonary nodules.  I reviewed the images prior to last CT scan and improvement. Recommendation is to follow with repeat CT scan in September 2020. I instructed the patient to avoid dehydration and encouraged expectoration of her sputum. Patient to continue on Aeriobika.  We discussed follow up with CT scan at this time and pt in agreement.  \par \par bronchiectasis \par \par Problem related to her previous infection. Discussed Bronchiectasis in detail with pt, she is asymptomatic at this time no worsening cough, appetite is good and recently been gaining weight.  Pt hemoptysis resolved follow with CT scan in September.\par \par I advised the patient the need for immunization.  She is exercising to improve her lung condition.  Lung exam unremarkable and oxygen saturation WNL.  Follow on repeat CT scan.  She is due for her PNA 23 and Flu up to date.  To given her the PNA 23 next visit with flu vaccine. \par \par Post nasal gtt \par \par Continue with saline spray as needed.   She is followed by ENT Dr. Queen on [address].  \par \par snoring/fatigue\par \par Reviewed the sleep study without any significant sleep disorder was observed by PHI criteria.\par \par Follow up in september\par \par

## 2020-07-30 NOTE — PHYSICAL EXAM
[Normal Appearance] : normal appearance [General Appearance - Well Developed] : well developed [Well Groomed] : well groomed [General Appearance - Well Nourished] : well nourished [No Deformities] : no deformities [General Appearance - In No Acute Distress] : no acute distress [Normal Conjunctiva] : the conjunctiva exhibited no abnormalities [Erythema] : erythema of the pharynx [Eyelids - No Xanthelasma] : the eyelids demonstrated no xanthelasmas [Normal Oropharynx] : normal oropharynx [Neck Cervical Mass (___cm)] : no neck mass was observed [Neck Appearance] : the appearance of the neck was normal [Thyroid Diffuse Enlargement] : the thyroid was not enlarged [Jugular Venous Distention Increased] : there was no jugular-venous distention [Thyroid Nodule] : there were no palpable thyroid nodules [Heart Sounds] : normal S1 and S2 [Murmurs] : no murmurs present [Heart Rate And Rhythm] : heart rate and rhythm were normal [Respiration, Rhythm And Depth] : normal respiratory rhythm and effort [Abdomen Soft] : soft [Abdomen Tenderness] : non-tender [Exaggerated Use Of Accessory Muscles For Inspiration] : no accessory muscle use [Auscultation Breath Sounds / Voice Sounds] : lungs were clear to auscultation bilaterally [Abdomen Mass (___ Cm)] : no abdominal mass palpated [Abnormal Walk] : normal gait [Gait - Sufficient For Exercise Testing] : the gait was sufficient for exercise testing [Cyanosis, Localized] : no localized cyanosis [Petechial Hemorrhages (___cm)] : no petechial hemorrhages [Nail Clubbing] : no clubbing of the fingernails [Skin Color & Pigmentation] : normal skin color and pigmentation [] : no rash [No Venous Stasis] : no venous stasis [Skin Lesions] : no skin lesions [No Skin Ulcers] : no skin ulcer [No Xanthoma] : no  xanthoma was observed [Deep Tendon Reflexes (DTR)] : deep tendon reflexes were 2+ and symmetric [Sensation] : the sensory exam was normal to light touch and pinprick [No Focal Deficits] : no focal deficits [FreeTextEntry1] : congestion to right nasal

## 2020-07-30 NOTE — REASON FOR VISIT
[Shortness of Breath] : shortness of Breath [Follow-Up] : a follow-up visit [Bronchiectasis] : bronchiectasis [FreeTextEntry2] : Follow up

## 2020-07-30 NOTE — HISTORY OF PRESENT ILLNESS
[Stable] : are stable [Difficulty Breathing During Exertion] : stable dyspnea on exertion [Feelings Of Weakness On Exertion] : stable exercise intolerance [Chest Pain Or Discomfort] : denies chest pain [Regional Soft Tissue Swelling Both Lower Extremities] : denies lower extremity edema [Cough] : stable coughing [Wheezing] : denies wheezing [Fever] : denies fever [3  -  Moderate] : 3, moderate [Class I - No Symptoms and No Limitations] : I [Snoring] : snoring [Witnessed Apneas] : witnessed sleep apnea [Daytime Somnolence] : daytime somnolence [Unintentional Sleep while Active] : unintentional sleep while active [Unintentional Sleep While Inactive] : unintentional sleep while inactive [TextBox_4] : She is here for follow up.  She had a episode of hemoptysis of a tables spoon 2 weeks ago.  She is no longer coughing or having nasal congestion.  Her humidifier stopped working  and noticed that her cough improved afterwards.  She clears weekly her humidifier.  She is feeling clear in the mucus.  \par \par She felt SOB today with walking.  \par \par Her PCP retired Dr. Nichols.  \par \par She denies any fevers, wheezing, coughing improved. She is not SOB she is not doing jump robe and weights without SOB.  she is sleeping well.  Denies ER visit.  Her appetite is good and not loosing weight. \par \par She has a humidifier, air purifier, steaming, expectorant and using aerobika as needed.  [Wt Gain ___ Lbs] : no recent weight gain [Wt Loss ___ Lbs] : no recent weight loss [Oxygen] : the patient uses no supplemental oxygen

## 2020-09-11 ENCOUNTER — APPOINTMENT (OUTPATIENT)
Dept: INTERNAL MEDICINE | Facility: CLINIC | Age: 65
End: 2020-09-11
Payer: COMMERCIAL

## 2020-09-11 VITALS
HEART RATE: 76 BPM | WEIGHT: 116 LBS | BODY MASS INDEX: 21.9 KG/M2 | OXYGEN SATURATION: 99 % | SYSTOLIC BLOOD PRESSURE: 123 MMHG | DIASTOLIC BLOOD PRESSURE: 73 MMHG | HEIGHT: 61 IN | TEMPERATURE: 98.7 F

## 2020-09-11 PROCEDURE — 99387 INIT PM E/M NEW PAT 65+ YRS: CPT | Mod: 25

## 2020-09-11 PROCEDURE — 93000 ELECTROCARDIOGRAM COMPLETE: CPT

## 2020-09-11 PROCEDURE — 36415 COLL VENOUS BLD VENIPUNCTURE: CPT

## 2020-09-11 NOTE — PHYSICAL EXAM
[No Acute Distress] : no acute distress [Well Nourished] : well nourished [Well Developed] : well developed [Normal Sclera/Conjunctiva] : normal sclera/conjunctiva [Well-Appearing] : well-appearing [PERRL] : pupils equal round and reactive to light [Normal Oropharynx] : the oropharynx was normal [Normal Outer Ear/Nose] : the outer ears and nose were normal in appearance [EOMI] : extraocular movements intact [No Lymphadenopathy] : no lymphadenopathy [No JVD] : no jugular venous distention [No Respiratory Distress] : no respiratory distress  [Thyroid Normal, No Nodules] : the thyroid was normal and there were no nodules present [Supple] : supple [No Accessory Muscle Use] : no accessory muscle use [Normal Rate] : normal rate  [Clear to Auscultation] : lungs were clear to auscultation bilaterally [Regular Rhythm] : with a regular rhythm [Normal S1, S2] : normal S1 and S2 [No Carotid Bruits] : no carotid bruits [No Murmur] : no murmur heard [No Abdominal Bruit] : a ~M bruit was not heard ~T in the abdomen [No Varicosities] : no varicosities [No Palpable Aorta] : no palpable aorta [No Edema] : there was no peripheral edema [Pedal Pulses Present] : the pedal pulses are present [No Extremity Clubbing/Cyanosis] : no extremity clubbing/cyanosis [Soft] : abdomen soft [Non-distended] : non-distended [No Masses] : no abdominal mass palpated [Non Tender] : non-tender [No HSM] : no HSM [Normal Bowel Sounds] : normal bowel sounds [Normal Posterior Cervical Nodes] : no posterior cervical lymphadenopathy [Normal Anterior Cervical Nodes] : no anterior cervical lymphadenopathy [No CVA Tenderness] : no CVA  tenderness [Grossly Normal Strength/Tone] : grossly normal strength/tone [No Spinal Tenderness] : no spinal tenderness [No Joint Swelling] : no joint swelling [No Rash] : no rash [No Focal Deficits] : no focal deficits [Coordination Grossly Intact] : coordination grossly intact [Normal Insight/Judgement] : insight and judgment were intact [Normal Affect] : the affect was normal [Normal Gait] : normal gait

## 2020-09-11 NOTE — HISTORY OF PRESENT ILLNESS
[FreeTextEntry1] : Interim reviewed;  Some shortness of breath with  exertion [de-identified] : Gets burning sensation at times;  Question of acid reflux;  Not taking any stomach medication; \par No children;\par Dad   heraclio Cancer\par Mom   osteoporosis ; After fall\par Sister CVA\par Another sister osteoporosis\par 2 Brothers;  Alive .

## 2020-09-11 NOTE — ASSESSMENT
[FreeTextEntry1] : Normal examination; Would try antacids regarding feeling in throat; Will obtain EKG and bloods; Also needs bone density ;

## 2020-09-11 NOTE — HEALTH RISK ASSESSMENT
[Good] : ~his/her~  mood as  good [No] : No [No falls in past year] : Patient reported no falls in the past year [0] : 2) Feeling down, depressed, or hopeless: Not at all (0) [Patient reported mammogram was normal] : Patient reported mammogram was normal [Patient reported bone density results were normal] : Patient reported bone density results were normal [Patient reported PAP Smear was normal] : Patient reported PAP Smear was normal [HIV test declined] : HIV test declined [Patient reported colonoscopy was normal] : Patient reported colonoscopy was normal [Employed] : employed [] :  [Hepatitis C test declined] : Hepatitis C test declined [VIG2Mefnc] : 0 [YearQuit] : 1993 [] : No [PapSmearDate] : 07/2018 [MammogramDate] : 07/2019 [ColonoscopyDate] : 02/2017 [BoneDensityComments] : needs repeat [BoneDensityDate] : 05/2015

## 2020-09-14 LAB
25(OH)D3 SERPL-MCNC: 49.6 NG/ML
ALBUMIN SERPL ELPH-MCNC: 4.9 G/DL
ALP BLD-CCNC: 65 U/L
ALT SERPL-CCNC: 20 U/L
ANION GAP SERPL CALC-SCNC: 17 MMOL/L
APPEARANCE: CLEAR
AST SERPL-CCNC: 26 U/L
BACTERIA: NEGATIVE
BASOPHILS # BLD AUTO: 0.02 K/UL
BASOPHILS NFR BLD AUTO: 0.3 %
BILIRUB SERPL-MCNC: 0.2 MG/DL
BILIRUBIN URINE: NEGATIVE
BLOOD URINE: NEGATIVE
BUN SERPL-MCNC: 12 MG/DL
CALCIUM SERPL-MCNC: 9.6 MG/DL
CHLORIDE SERPL-SCNC: 102 MMOL/L
CHOLEST SERPL-MCNC: 214 MG/DL
CHOLEST/HDLC SERPL: 2.9 RATIO
CO2 SERPL-SCNC: 22 MMOL/L
COLOR: COLORLESS
CREAT SERPL-MCNC: 0.63 MG/DL
EOSINOPHIL # BLD AUTO: 0.02 K/UL
EOSINOPHIL NFR BLD AUTO: 0.3 %
ESTIMATED AVERAGE GLUCOSE: 117 MG/DL
GLUCOSE QUALITATIVE U: NEGATIVE
GLUCOSE SERPL-MCNC: 94 MG/DL
HBA1C MFR BLD HPLC: 5.7 %
HCT VFR BLD CALC: 43.5 %
HDLC SERPL-MCNC: 73 MG/DL
HGB BLD-MCNC: 13.4 G/DL
HYALINE CASTS: 0 /LPF
IMM GRANULOCYTES NFR BLD AUTO: 0.2 %
KETONES URINE: NORMAL
LDLC SERPL CALC-MCNC: 122 MG/DL
LEUKOCYTE ESTERASE URINE: ABNORMAL
LYMPHOCYTES # BLD AUTO: 1.73 K/UL
LYMPHOCYTES NFR BLD AUTO: 27.1 %
MAN DIFF?: NORMAL
MCHC RBC-ENTMCNC: 29 PG
MCHC RBC-ENTMCNC: 30.8 GM/DL
MCV RBC AUTO: 94.2 FL
MICROSCOPIC-UA: NORMAL
MONOCYTES # BLD AUTO: 0.57 K/UL
MONOCYTES NFR BLD AUTO: 8.9 %
NEUTROPHILS # BLD AUTO: 4.04 K/UL
NEUTROPHILS NFR BLD AUTO: 63.2 %
NITRITE URINE: NEGATIVE
PH URINE: 6
PLATELET # BLD AUTO: 232 K/UL
POTASSIUM SERPL-SCNC: 4.3 MMOL/L
PROT SERPL-MCNC: 7.3 G/DL
PROTEIN URINE: NEGATIVE
RBC # BLD: 4.62 M/UL
RBC # FLD: 15 %
RED BLOOD CELLS URINE: 3 /HPF
SODIUM SERPL-SCNC: 141 MMOL/L
SPECIFIC GRAVITY URINE: 1.01
SQUAMOUS EPITHELIAL CELLS: 1 /HPF
T4 FREE SERPL-MCNC: 1.2 NG/DL
TRIGL SERPL-MCNC: 97 MG/DL
TSH SERPL-ACNC: 1.64 UIU/ML
UROBILINOGEN URINE: NORMAL
WBC # FLD AUTO: 6.39 K/UL
WHITE BLOOD CELLS URINE: 3 /HPF

## 2020-09-22 ENCOUNTER — RESULT REVIEW (OUTPATIENT)
Age: 65
End: 2020-09-22

## 2020-09-22 ENCOUNTER — APPOINTMENT (OUTPATIENT)
Dept: CT IMAGING | Facility: HOSPITAL | Age: 65
End: 2020-09-22
Payer: COMMERCIAL

## 2020-09-22 ENCOUNTER — OUTPATIENT (OUTPATIENT)
Dept: OUTPATIENT SERVICES | Facility: HOSPITAL | Age: 65
LOS: 1 days | End: 2020-09-22
Payer: COMMERCIAL

## 2020-09-22 PROCEDURE — 71250 CT THORAX DX C-: CPT | Mod: 26

## 2020-09-22 PROCEDURE — 71250 CT THORAX DX C-: CPT

## 2020-09-29 ENCOUNTER — APPOINTMENT (OUTPATIENT)
Dept: PULMONOLOGY | Facility: CLINIC | Age: 65
End: 2020-09-29
Payer: COMMERCIAL

## 2020-09-29 ENCOUNTER — MED ADMIN CHARGE (OUTPATIENT)
Age: 65
End: 2020-09-29

## 2020-09-29 VITALS
HEART RATE: 95 BPM | TEMPERATURE: 98.2 F | WEIGHT: 117 LBS | BODY MASS INDEX: 22.09 KG/M2 | HEIGHT: 61 IN | OXYGEN SATURATION: 97 % | SYSTOLIC BLOOD PRESSURE: 110 MMHG | RESPIRATION RATE: 12 BRPM | DIASTOLIC BLOOD PRESSURE: 70 MMHG

## 2020-09-29 DIAGNOSIS — Z87.891 PERSONAL HISTORY OF NICOTINE DEPENDENCE: ICD-10-CM

## 2020-09-29 PROCEDURE — 90662 IIV NO PRSV INCREASED AG IM: CPT

## 2020-09-29 PROCEDURE — G0008: CPT

## 2020-09-29 PROCEDURE — 99214 OFFICE O/P EST MOD 30 MIN: CPT | Mod: 25

## 2020-09-29 NOTE — HISTORY OF PRESENT ILLNESS
[Stable] : are stable [Difficulty Breathing During Exertion] : stable dyspnea on exertion [Feelings Of Weakness On Exertion] : stable exercise intolerance [Cough] : stable coughing [Wheezing] : denies wheezing [Regional Soft Tissue Swelling Both Lower Extremities] : denies lower extremity edema [Chest Pain Or Discomfort] : denies chest pain [Fever] : denies fever [3  -  Moderate] : 3, moderate [Class I - No Symptoms and No Limitations] : I [Snoring] : snoring [Witnessed Apneas] : witnessed sleep apnea [Daytime Somnolence] : daytime somnolence [Unintentional Sleep while Active] : unintentional sleep while active [Unintentional Sleep While Inactive] : unintentional sleep while inactive [TextBox_4] : \par 9/29/2020\par \par She is here to review her CT scan.  She states her breathing is fine.  She coughs mostly at night without any mucus production.  She has occasional heat in the throat and chest.  She does get acid reflux.  She tested COVID on 4/15 negative.  Weight is stable and she is eating well.  Denies any fever or hemoptysis.   Occasional wheezing.  She is walking a couple miles a day without SOB.  [Wt Gain ___ Lbs] : no recent weight gain [Wt Loss ___ Lbs] : no recent weight loss [Oxygen] : the patient uses no supplemental oxygen

## 2020-09-29 NOTE — REVIEW OF SYSTEMS
[Postnasal Drip] : postnasal drip [Negative] : Sleep Disorder [Nasal Congestion] : no nasal congestion [Cough] : no cough [Dyspnea] : no dyspnea

## 2020-09-29 NOTE — PHYSICAL EXAM
[General Appearance - Well Developed] : well developed [Normal Appearance] : normal appearance [Well Groomed] : well groomed [General Appearance - Well Nourished] : well nourished [No Deformities] : no deformities [General Appearance - In No Acute Distress] : no acute distress [Normal Conjunctiva] : the conjunctiva exhibited no abnormalities [Eyelids - No Xanthelasma] : the eyelids demonstrated no xanthelasmas [Normal Oropharynx] : normal oropharynx [Erythema] : erythema of the pharynx [Neck Appearance] : the appearance of the neck was normal [Neck Cervical Mass (___cm)] : no neck mass was observed [Jugular Venous Distention Increased] : there was no jugular-venous distention [Thyroid Diffuse Enlargement] : the thyroid was not enlarged [Thyroid Nodule] : there were no palpable thyroid nodules [Heart Rate And Rhythm] : heart rate and rhythm were normal [Heart Sounds] : normal S1 and S2 [Murmurs] : no murmurs present [Respiration, Rhythm And Depth] : normal respiratory rhythm and effort [Exaggerated Use Of Accessory Muscles For Inspiration] : no accessory muscle use [Auscultation Breath Sounds / Voice Sounds] : lungs were clear to auscultation bilaterally [Abdomen Soft] : soft [Abdomen Tenderness] : non-tender [Abdomen Mass (___ Cm)] : no abdominal mass palpated [Abnormal Walk] : normal gait [Gait - Sufficient For Exercise Testing] : the gait was sufficient for exercise testing [Nail Clubbing] : no clubbing of the fingernails [Cyanosis, Localized] : no localized cyanosis [Petechial Hemorrhages (___cm)] : no petechial hemorrhages [Skin Color & Pigmentation] : normal skin color and pigmentation [] : no rash [No Venous Stasis] : no venous stasis [Skin Lesions] : no skin lesions [No Skin Ulcers] : no skin ulcer [No Xanthoma] : no  xanthoma was observed [Deep Tendon Reflexes (DTR)] : deep tendon reflexes were 2+ and symmetric [Sensation] : the sensory exam was normal to light touch and pinprick [No Focal Deficits] : no focal deficits [FreeTextEntry1] : congestion to right nasal

## 2020-09-29 NOTE — REASON FOR VISIT
[Follow-Up] : a follow-up visit [Bronchiectasis] : bronchiectasis [Shortness of Breath] : shortness of Breath [FreeTextEntry2] : Follow up

## 2020-09-29 NOTE — ASSESSMENT
[FreeTextEntry1] : Pulmonary nodules\par \par I discussed the case in details with the patient. I reviewed last CT scan from 9/2020 and 1/2020 findings compatible with small large airway inflammation and there is a new wedge shaped area os consolidation measuring 14 x 16 mm etiology is unclear. Discussed differential dx of mucus plugging, inflammation, DEDRICK, malignancy but not limited too.  Pt is asymptotic at this time without signs of infection.  Pt has a history of smoking and lung CA in the family her dad.  Discussed will follow with bronchoscopy at this time to obtain samples for DEDRICK, fungus, CA cells and repeat CT scan in 3 months (order placed for 12/2020).  \par \par - Lab work draw for bronchoscopy\par - EKG performed with Dr. Faria in early September\par - Ordered bronchoscopy and office to work on auth and schedule pt\par \par  I instructed the patient to avoid dehydration and encouraged expectoration of her sputum. Patient to continue on Aeriobika. \par \par bronchiectasis \par \par  Discussed Bronchiectasis in detail with pt, she is asymptomatic at this time no worsening cough, appetite is good, hemoptysis, and weight is stable. \par \par Pt was given the high dose flu vaccine in the right deltoid without any reaction.  She is exercising to improve her lung condition. Lung exam unremarkable and oxygen saturation WNL. To given her the PNA 23 next visit.  \par \par Post nasal gtt \par \par Continue with saline spray as needed. She is followed by ENT Dr. Queen on [address]. \par \par snoring/fatigue\par \par Reviewed the sleep study without any significant sleep disorder was observed by PHI criteria.\par \par Follow up post bronchoscopy.\par . \par \par  \par

## 2020-10-01 LAB
ALBUMIN SERPL ELPH-MCNC: 4.9 G/DL
ALP BLD-CCNC: 72 U/L
ALT SERPL-CCNC: 19 U/L
ANION GAP SERPL CALC-SCNC: 13 MMOL/L
APTT BLD: 32.8 SEC
AST SERPL-CCNC: 24 U/L
BASOPHILS # BLD AUTO: 0.02 K/UL
BASOPHILS NFR BLD AUTO: 0.3 %
BILIRUB SERPL-MCNC: 0.2 MG/DL
BUN SERPL-MCNC: 10 MG/DL
CALCIUM SERPL-MCNC: 10.3 MG/DL
CHLORIDE SERPL-SCNC: 105 MMOL/L
CO2 SERPL-SCNC: 27 MMOL/L
CREAT SERPL-MCNC: 0.65 MG/DL
EOSINOPHIL # BLD AUTO: 0.01 K/UL
EOSINOPHIL NFR BLD AUTO: 0.1 %
GLUCOSE SERPL-MCNC: 98 MG/DL
HCT VFR BLD CALC: 44 %
HGB BLD-MCNC: 13.8 G/DL
IMM GRANULOCYTES NFR BLD AUTO: 0.3 %
INR PPP: 0.95 RATIO
LYMPHOCYTES # BLD AUTO: 2.35 K/UL
LYMPHOCYTES NFR BLD AUTO: 33.7 %
MAN DIFF?: NORMAL
MCHC RBC-ENTMCNC: 29.9 PG
MCHC RBC-ENTMCNC: 31.4 GM/DL
MCV RBC AUTO: 95.2 FL
MONOCYTES # BLD AUTO: 0.6 K/UL
MONOCYTES NFR BLD AUTO: 8.6 %
NEUTROPHILS # BLD AUTO: 3.97 K/UL
NEUTROPHILS NFR BLD AUTO: 57 %
PLATELET # BLD AUTO: 254 K/UL
POTASSIUM SERPL-SCNC: 5.5 MMOL/L
PROT SERPL-MCNC: 7.2 G/DL
PT BLD: 11.2 SEC
RBC # BLD: 4.62 M/UL
RBC # FLD: 15.1 %
SODIUM SERPL-SCNC: 145 MMOL/L
WBC # FLD AUTO: 6.97 K/UL

## 2020-10-05 ENCOUNTER — APPOINTMENT (OUTPATIENT)
Dept: DISASTER EMERGENCY | Facility: CLINIC | Age: 65
End: 2020-10-05

## 2020-10-06 LAB — SARS-COV-2 N GENE NPH QL NAA+PROBE: NOT DETECTED

## 2020-10-08 ENCOUNTER — RESULT REVIEW (OUTPATIENT)
Age: 65
End: 2020-10-08

## 2020-10-08 ENCOUNTER — APPOINTMENT (OUTPATIENT)
Dept: PULMONOLOGY | Facility: HOSPITAL | Age: 65
End: 2020-10-08

## 2020-10-08 ENCOUNTER — OUTPATIENT (OUTPATIENT)
Dept: OUTPATIENT SERVICES | Facility: HOSPITAL | Age: 65
LOS: 1 days | Discharge: HOME | End: 2020-10-08
Payer: COMMERCIAL

## 2020-10-08 LAB
BASE EXCESS BLDV CALC-SCNC: 2.7 MMOL/L — SIGNIFICANT CHANGE UP
CA-I SERPL-SCNC: 1.17 MMOL/L — SIGNIFICANT CHANGE UP (ref 1.12–1.3)
GAS PNL BLDV: 139 MMOL/L — SIGNIFICANT CHANGE UP (ref 138–146)
GAS PNL BLDV: SIGNIFICANT CHANGE UP
GAS PNL BLDV: SIGNIFICANT CHANGE UP
GRAM STN FLD: SIGNIFICANT CHANGE UP
HCO3 BLDV-SCNC: 29 MMOL/L — HIGH (ref 20–27)
NIGHT BLUE STAIN TISS: SIGNIFICANT CHANGE UP
PCO2 BLDV: 50 MMHG — SIGNIFICANT CHANGE UP (ref 41–51)
PH BLDV: 7.38 — SIGNIFICANT CHANGE UP (ref 7.32–7.43)
PO2 BLDV: 31 MMHG — SIGNIFICANT CHANGE UP
POTASSIUM BLDV-SCNC: 4.3 MMOL/L — SIGNIFICANT CHANGE UP (ref 3.5–4.9)
SAO2 % BLDV: 48 % — SIGNIFICANT CHANGE UP
SPECIMEN SOURCE: SIGNIFICANT CHANGE UP
SPECIMEN SOURCE: SIGNIFICANT CHANGE UP

## 2020-10-08 PROCEDURE — 82330 ASSAY OF CALCIUM: CPT

## 2020-10-08 PROCEDURE — 88305 TISSUE EXAM BY PATHOLOGIST: CPT

## 2020-10-08 PROCEDURE — 87305 ASPERGILLUS AG IA: CPT

## 2020-10-08 PROCEDURE — 82803 BLOOD GASES ANY COMBINATION: CPT

## 2020-10-08 PROCEDURE — 84295 ASSAY OF SERUM SODIUM: CPT

## 2020-10-08 PROCEDURE — 84132 ASSAY OF SERUM POTASSIUM: CPT

## 2020-10-08 PROCEDURE — 87449 NOS EACH ORGANISM AG IA: CPT

## 2020-10-08 PROCEDURE — 88112 CYTOPATH CELL ENHANCE TECH: CPT | Mod: 26

## 2020-10-08 PROCEDURE — 87015 SPECIMEN INFECT AGNT CONCNTJ: CPT

## 2020-10-08 PROCEDURE — 87116 MYCOBACTERIA CULTURE: CPT

## 2020-10-08 PROCEDURE — 31622 DX BRONCHOSCOPE/WASH: CPT

## 2020-10-08 PROCEDURE — 87206 SMEAR FLUORESCENT/ACID STAI: CPT

## 2020-10-08 PROCEDURE — 87070 CULTURE OTHR SPECIMN AEROBIC: CPT

## 2020-10-08 PROCEDURE — 88112 CYTOPATH CELL ENHANCE TECH: CPT

## 2020-10-08 PROCEDURE — 88312 SPECIAL STAINS GROUP 1: CPT | Mod: 26

## 2020-10-08 PROCEDURE — 88312 SPECIAL STAINS GROUP 1: CPT

## 2020-10-08 PROCEDURE — 31622 DX BRONCHOSCOPE/WASH: CPT | Mod: GC

## 2020-10-08 PROCEDURE — 88305 TISSUE EXAM BY PATHOLOGIST: CPT | Mod: 26

## 2020-10-08 PROCEDURE — 87102 FUNGUS ISOLATION CULTURE: CPT

## 2020-10-10 LAB
CULTURE RESULTS: SIGNIFICANT CHANGE UP
FUNGITELL B-D-GLUCAN,  BRONCHIAL LAVAGE: SIGNIFICANT CHANGE UP
SPECIMEN SOURCE: SIGNIFICANT CHANGE UP

## 2020-10-11 LAB — GALACTOMANNAN AG SPEC IA-ACNC: <0.5 INDEX — SIGNIFICANT CHANGE UP

## 2020-10-12 LAB — NON-GYNECOLOGICAL CYTOLOGY STUDY: SIGNIFICANT CHANGE UP

## 2020-10-13 DIAGNOSIS — M19.90 UNSPECIFIED OSTEOARTHRITIS, UNSPECIFIED SITE: ICD-10-CM

## 2020-10-13 DIAGNOSIS — J47.9 BRONCHIECTASIS, UNCOMPLICATED: ICD-10-CM

## 2020-10-13 DIAGNOSIS — Z80.1 FAMILY HISTORY OF MALIGNANT NEOPLASM OF TRACHEA, BRONCHUS AND LUNG: ICD-10-CM

## 2020-10-13 DIAGNOSIS — G47.33 OBSTRUCTIVE SLEEP APNEA (ADULT) (PEDIATRIC): ICD-10-CM

## 2020-10-13 DIAGNOSIS — Z87.891 PERSONAL HISTORY OF NICOTINE DEPENDENCE: ICD-10-CM

## 2020-10-14 ENCOUNTER — APPOINTMENT (OUTPATIENT)
Dept: PULMONOLOGY | Facility: CLINIC | Age: 65
End: 2020-10-14
Payer: COMMERCIAL

## 2020-10-14 PROCEDURE — 99214 OFFICE O/P EST MOD 30 MIN: CPT | Mod: 95

## 2020-10-18 NOTE — REASON FOR VISIT
[Home] : at home, [unfilled] , at the time of the visit. [Medical Office: ([facility])___] : at the medical office located in  [Follow-Up] : a follow-up visit

## 2020-10-18 NOTE — ASSESSMENT
[FreeTextEntry1] : I discussed the condition in details with the patient.  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  That could be related to MTB ro DEDRICK.  The condition is mostly related to DEDRICK.  There is no clinical evidence of active MTB.  Await the DNA probe for identification of the organism.  ? if she will need treatment for DEDRICK\par

## 2020-10-28 ENCOUNTER — TRANSCRIPTION ENCOUNTER (OUTPATIENT)
Age: 65
End: 2020-10-28

## 2020-11-07 LAB
CULTURE RESULTS: SIGNIFICANT CHANGE UP
SPECIMEN SOURCE: SIGNIFICANT CHANGE UP

## 2020-11-09 ENCOUNTER — APPOINTMENT (OUTPATIENT)
Dept: PULMONOLOGY | Facility: CLINIC | Age: 65
End: 2020-11-09
Payer: COMMERCIAL

## 2020-11-09 VITALS
WEIGHT: 115 LBS | TEMPERATURE: 98.2 F | BODY MASS INDEX: 21.71 KG/M2 | SYSTOLIC BLOOD PRESSURE: 110 MMHG | OXYGEN SATURATION: 99 % | HEART RATE: 91 BPM | HEIGHT: 61 IN | DIASTOLIC BLOOD PRESSURE: 76 MMHG

## 2020-11-09 PROCEDURE — 99214 OFFICE O/P EST MOD 30 MIN: CPT | Mod: 25

## 2020-11-09 PROCEDURE — 90732 PPSV23 VACC 2 YRS+ SUBQ/IM: CPT

## 2020-11-09 PROCEDURE — 99072 ADDL SUPL MATRL&STAF TM PHE: CPT

## 2020-11-09 PROCEDURE — G0009: CPT

## 2020-11-09 NOTE — HISTORY OF PRESENT ILLNESS
[Former] : former [Never] : never [TextBox_4] : Doing very well.  She has no cough now.  But sometimes she has cough and neither she can bring up phlegm.  She has not coughed blood.  She has no fever chills night sweats.  Her appetite is good

## 2020-11-09 NOTE — ASSESSMENT
[FreeTextEntry1] : Abnormal CT scan bronchiectasis pulmonary nodules\par \par I discussed the case in details with the patient.  I reviewed the CT scan images with her.  I educated the patient on bronchiectasis.  Explained to the her there is abnormality of the bronchial wall and structural and immunological status.  I also informed her that DEDRICK is a colonizer and at this point there is no evidence of underlying infectious process.  The CT scan of the chest revealed bronchiectasis area of old tree and bud nodules, and mucus impaction.  At this point there is no evidence of underlying infectious process no indication for triple therapy for DEDRICK infection.  The concern in the nodule and the lingula which is could be area inflammation versus mucus impaction.  I will repeat the CT scan of the chest and 3 months from the original 1 to confirm the stability of the nodule in the inflammatory nature\par \par I despond observe the patient.\par \par I informed the patient that to monitor the secretions especially of the and change in the amount, color, presence of blood, and fever.  And also informed the patient avoid azithromycin at its is the main drug for the treatment of DEDRICK infection.\par \par Pneumococcal vaccine was given in the right deltoid area and tolerated well.

## 2020-11-23 ENCOUNTER — APPOINTMENT (OUTPATIENT)
Dept: INTERNAL MEDICINE | Facility: CLINIC | Age: 65
End: 2020-11-23
Payer: COMMERCIAL

## 2020-11-23 VITALS
BODY MASS INDEX: 21.9 KG/M2 | WEIGHT: 116 LBS | OXYGEN SATURATION: 98 % | HEART RATE: 82 BPM | HEIGHT: 61 IN | SYSTOLIC BLOOD PRESSURE: 134 MMHG | TEMPERATURE: 97.8 F | DIASTOLIC BLOOD PRESSURE: 80 MMHG

## 2020-11-23 PROCEDURE — 99213 OFFICE O/P EST LOW 20 MIN: CPT

## 2020-11-23 NOTE — ASSESSMENT
[FreeTextEntry1] : Stable examination; Did not want to be examined; No change in current medication \par Will return for full physical

## 2020-11-23 NOTE — HISTORY OF PRESENT ILLNESS
[FreeTextEntry1] : Dr. Gomes follow up noted [de-identified] : As above;  Has DEDRICK; No treatment at this time\par Bone density and mammogram noted\par Some osteopenia\par Recommended Calcium\par  Taking Vitamin D; \par Labs reviewed with patient \par Does exercise; Weights\par

## 2020-11-25 LAB
CULTURE RESULTS: SIGNIFICANT CHANGE UP
SPECIMEN SOURCE: SIGNIFICANT CHANGE UP

## 2020-12-17 ENCOUNTER — RX RENEWAL (OUTPATIENT)
Age: 65
End: 2020-12-17

## 2020-12-31 ENCOUNTER — OUTPATIENT (OUTPATIENT)
Dept: OUTPATIENT SERVICES | Facility: HOSPITAL | Age: 65
LOS: 1 days | End: 2020-12-31
Payer: COMMERCIAL

## 2020-12-31 ENCOUNTER — APPOINTMENT (OUTPATIENT)
Dept: CT IMAGING | Facility: HOSPITAL | Age: 65
End: 2020-12-31
Payer: COMMERCIAL

## 2020-12-31 PROCEDURE — 71250 CT THORAX DX C-: CPT | Mod: 26

## 2020-12-31 PROCEDURE — 71250 CT THORAX DX C-: CPT

## 2021-02-09 ENCOUNTER — APPOINTMENT (OUTPATIENT)
Dept: PULMONOLOGY | Facility: CLINIC | Age: 66
End: 2021-02-09
Payer: COMMERCIAL

## 2021-02-09 VITALS
DIASTOLIC BLOOD PRESSURE: 86 MMHG | SYSTOLIC BLOOD PRESSURE: 132 MMHG | TEMPERATURE: 97.5 F | HEART RATE: 75 BPM | HEIGHT: 61 IN | OXYGEN SATURATION: 98 % | WEIGHT: 115 LBS | BODY MASS INDEX: 21.71 KG/M2

## 2021-02-09 PROCEDURE — 99072 ADDL SUPL MATRL&STAF TM PHE: CPT

## 2021-02-09 PROCEDURE — 99214 OFFICE O/P EST MOD 30 MIN: CPT

## 2021-02-09 RX ORDER — CEFDINIR 300 MG/1
300 CAPSULE ORAL
Qty: 20 | Refills: 0 | Status: DISCONTINUED | COMMUNITY
Start: 2021-01-11 | End: 2021-02-09

## 2021-02-09 NOTE — HISTORY OF PRESENT ILLNESS
[Never] : never [TextBox_4] : Is not coughing blood anymore.  She is not short of breath.  And she is following up on the CT scan

## 2021-02-09 NOTE — ASSESSMENT
[FreeTextEntry1] : Bronchiectasis.  I reviewed the CT scan of the chest in details with the patient and reviewed the images with her.  Patient has bronchiectasis especially involving in the right middle lobe.  I educated on bronchiectasis with the structure and the immunological changes associated with it.  The hemoptysis was most likely related to bleeding secondary to congestion secondary to either bacterial or viral infection that resolved.  The nodules are most likely related to either inflammatory process or DEDRICK infection.  At this point I discussed the treatment for bronchiectasis which is include but not limited to aggressive pulmonary toilet immunization, avoid azithromycin and Levaquin, and evaluate the nature of the sputum production.  Patient is not using the aerobic care.  Patient is stable right now with no evidence is of underlying DEDRICK infection.  I explained the side effects of treatment of DEDRICK with azithromycin ethambutol and rifampin.  At this point the patient is asymptomatic with no evidence of underlying infectious process and no indication for treatment at this point.  The only indication is aggressive pulmonary toilet and avoid dehydration.  Instructed the patient to use the aerobic a.  Patient was seen by another pulmonologist for second opinion who confirmed my recommendation and was prescribed Mucomyst.  Indicated to the patient that having no objection from Mucomyst all except a might trigger airway irritation.  We will follow-up with the CT scan in 1 year.  The patient he is to contact me if there is any change in her condition.  We will repeat the CT scan of the chest in  6 months

## 2021-04-07 ENCOUNTER — RX RENEWAL (OUTPATIENT)
Age: 66
End: 2021-04-07

## 2021-07-26 ENCOUNTER — APPOINTMENT (OUTPATIENT)
Dept: PULMONOLOGY | Facility: CLINIC | Age: 66
End: 2021-07-26
Payer: COMMERCIAL

## 2021-07-26 PROCEDURE — 99443: CPT

## 2021-07-26 NOTE — REASON FOR VISIT
[Home] : at home, [unfilled] , at the time of the visit. [Medical Office: ([facility])___] : at the medical office located in  [Follow-Up] : a follow-up visit

## 2021-07-26 NOTE — HISTORY OF PRESENT ILLNESS
[TextBox_4] : Saturday she felt stream coughing down her throat and when she spit it was blood.  This lasted for 15 minutes in and off and did not recur.  She had bleeding from the nose saturday as well.  She is not coughing much and sputum is normal in color,  She had blood when she blew her nose

## 2021-07-26 NOTE — ASSESSMENT
[FreeTextEntry1] : Patient has a history hemoptysis and had a bronchoscopy that was unremarkable and she had DEDRICK but no evidence of infection.  The description of her episode is most likely epistaxis rather than hemoptysis.  I will follow-up on the CT scan of the chest to be done done now instead of August and the patient will follow up with ENT that she saw about a year ago.  I informed the patient if this happens again is to contact me and come to the emergency room for further eval

## 2021-07-30 ENCOUNTER — RX RENEWAL (OUTPATIENT)
Age: 66
End: 2021-07-30

## 2021-08-17 ENCOUNTER — OUTPATIENT (OUTPATIENT)
Dept: OUTPATIENT SERVICES | Facility: HOSPITAL | Age: 66
LOS: 1 days | End: 2021-08-17
Payer: COMMERCIAL

## 2021-08-17 ENCOUNTER — APPOINTMENT (OUTPATIENT)
Dept: CT IMAGING | Facility: HOSPITAL | Age: 66
End: 2021-08-17
Payer: COMMERCIAL

## 2021-08-17 PROCEDURE — 71250 CT THORAX DX C-: CPT | Mod: 26

## 2021-08-17 PROCEDURE — 71250 CT THORAX DX C-: CPT

## 2021-08-30 ENCOUNTER — APPOINTMENT (OUTPATIENT)
Dept: PULMONOLOGY | Facility: CLINIC | Age: 66
End: 2021-08-30
Payer: COMMERCIAL

## 2021-08-30 VITALS
DIASTOLIC BLOOD PRESSURE: 82 MMHG | RESPIRATION RATE: 86 BRPM | HEIGHT: 61 IN | BODY MASS INDEX: 21.14 KG/M2 | TEMPERATURE: 97.6 F | WEIGHT: 112 LBS | SYSTOLIC BLOOD PRESSURE: 123 MMHG

## 2021-08-30 PROCEDURE — 36415 COLL VENOUS BLD VENIPUNCTURE: CPT

## 2021-08-30 PROCEDURE — 99214 OFFICE O/P EST MOD 30 MIN: CPT | Mod: 25

## 2021-08-31 ENCOUNTER — APPOINTMENT (OUTPATIENT)
Dept: INTERNAL MEDICINE | Facility: CLINIC | Age: 66
End: 2021-08-31
Payer: COMMERCIAL

## 2021-08-31 ENCOUNTER — TRANSCRIPTION ENCOUNTER (OUTPATIENT)
Age: 66
End: 2021-08-31

## 2021-08-31 VITALS
WEIGHT: 112 LBS | SYSTOLIC BLOOD PRESSURE: 127 MMHG | BODY MASS INDEX: 21.14 KG/M2 | HEART RATE: 83 BPM | TEMPERATURE: 99.1 F | HEIGHT: 61 IN | OXYGEN SATURATION: 99 % | DIASTOLIC BLOOD PRESSURE: 83 MMHG | RESPIRATION RATE: 16 BRPM

## 2021-08-31 DIAGNOSIS — F41.9 ANXIETY DISORDER, UNSPECIFIED: ICD-10-CM

## 2021-08-31 LAB
ALBUMIN SERPL ELPH-MCNC: 4.8 G/DL
ALP BLD-CCNC: 74 U/L
ALT SERPL-CCNC: 20 U/L
ANION GAP SERPL CALC-SCNC: 13 MMOL/L
APTT BLD: 33.1 SEC
AST SERPL-CCNC: 23 U/L
BASOPHILS # BLD AUTO: 0.01 K/UL
BASOPHILS NFR BLD AUTO: 0.1 %
BILIRUB SERPL-MCNC: 0.2 MG/DL
BUN SERPL-MCNC: 10 MG/DL
CALCIUM SERPL-MCNC: 10.1 MG/DL
CHLORIDE SERPL-SCNC: 100 MMOL/L
CO2 SERPL-SCNC: 26 MMOL/L
CREAT SERPL-MCNC: 0.64 MG/DL
EOSINOPHIL # BLD AUTO: 0.01 K/UL
EOSINOPHIL NFR BLD AUTO: 0.1 %
GLUCOSE SERPL-MCNC: 98 MG/DL
HCT VFR BLD CALC: 43.3 %
HGB BLD-MCNC: 13.8 G/DL
IMM GRANULOCYTES NFR BLD AUTO: 0.3 %
INR PPP: 0.98 RATIO
LYMPHOCYTES # BLD AUTO: 2.12 K/UL
LYMPHOCYTES NFR BLD AUTO: 31.3 %
MAN DIFF?: NORMAL
MCHC RBC-ENTMCNC: 29.4 PG
MCHC RBC-ENTMCNC: 31.9 GM/DL
MCV RBC AUTO: 92.1 FL
MONOCYTES # BLD AUTO: 0.53 K/UL
MONOCYTES NFR BLD AUTO: 7.8 %
NEUTROPHILS # BLD AUTO: 4.08 K/UL
NEUTROPHILS NFR BLD AUTO: 60.4 %
PLATELET # BLD AUTO: 258 K/UL
POTASSIUM SERPL-SCNC: 4.7 MMOL/L
PROT SERPL-MCNC: 7.6 G/DL
PT BLD: 11.7 SEC
RBC # BLD: 4.7 M/UL
RBC # FLD: 14.9 %
SODIUM SERPL-SCNC: 139 MMOL/L
WBC # FLD AUTO: 6.77 K/UL

## 2021-08-31 PROCEDURE — 99213 OFFICE O/P EST LOW 20 MIN: CPT

## 2021-08-31 RX ORDER — CHLORHEXIDINE GLUCONATE, 0.12% ORAL RINSE 1.2 MG/ML
0.12 SOLUTION DENTAL
Qty: 473 | Refills: 0 | Status: ACTIVE | COMMUNITY
Start: 2021-04-06

## 2021-08-31 NOTE — HISTORY OF PRESENT ILLNESS
[TextBox_4] : 66 yr old female with PMH of bronchiectasis, pulmonary nodule and hemoptysis.  Presents today for follow up post CT scan.\par \par She is coughing up intermittently grey sputum but not worse.  She is has a lot of stress with work due to cutting hours. She will no longer have benefit.  She denies fevers, hemoptysis, no change in appetite, wheezing or SOB.  She walks a mile without SOB.  She is having trouble sleeping.  \par

## 2021-08-31 NOTE — ASSESSMENT
[FreeTextEntry1] : Patient has a history hemoptysis and had a bronchoscopy 10/2020 that was unremarkable and she had DEDRICK but no evidence of infection at the time.  Repeat CT scan 8/17/2021 shows interval progression small and large airway inflammation with progressive cystic bronchiectasis and mucus plugging.  Pt has complaints of a more productive cough.  Discussed the need for bronchoscopy.  Concern pt insurance is up at of 8/31/2021.  She is going to move forward to obtain new insurance and let me know.  Discussed we can do the bronchoscopy within the next 30 days if longer will follow with repeat CT scan in 3 months.  Pt will update me on her insurance.  Labs drawn today for bronchoscopy.

## 2021-08-31 NOTE — ASSESSMENT
[FreeTextEntry1] : Pulmonary problems noted; Will need an bronchoscopy once insurance issues get resolved; \par Note for work to be out for 3 weeks secondary to pulmonary issues\par Xanax renewed;

## 2021-08-31 NOTE — END OF VISIT
[Time Spent: ___ minutes] : I have spent [unfilled] minutes of time on the encounter. [>50% of the face to face encounter time was spent on counseling and/or coordination of care for ___] : Greater than 50% of the face to face encounter time was spent on counseling and/or coordination of care for [unfilled] [FreeTextEntry3] : pt seen with GAIL saeed and agree on the above plan.

## 2021-08-31 NOTE — HISTORY OF PRESENT ILLNESS
[FreeTextEntry1] : Interim reviewed; :\par Dr Gomes follow up noted; \par needs bronchoscopy but insurance; \par Coughing and blood in mucus [de-identified] : Blessing addition CT worsened; \par Very concerned about recent cutting hours at work

## 2021-09-17 ENCOUNTER — APPOINTMENT (OUTPATIENT)
Dept: PULMONOLOGY | Facility: CLINIC | Age: 66
End: 2021-09-17
Payer: COMMERCIAL

## 2021-09-17 VITALS
OXYGEN SATURATION: 97 % | DIASTOLIC BLOOD PRESSURE: 68 MMHG | RESPIRATION RATE: 12 BRPM | TEMPERATURE: 97.9 F | WEIGHT: 110 LBS | SYSTOLIC BLOOD PRESSURE: 112 MMHG | BODY MASS INDEX: 20.77 KG/M2 | HEART RATE: 78 BPM | HEIGHT: 61 IN

## 2021-09-17 PROCEDURE — 99214 OFFICE O/P EST MOD 30 MIN: CPT

## 2021-09-17 NOTE — REVIEW OF SYSTEMS
[Cough] : cough [Hemoptysis] : hemoptysis [Sputum] : sputum [Negative] : Endocrine [Fever] : no fever [Chest Tightness] : no chest tightness [Dyspnea] : no dyspnea [Pleuritic Pain] : no pleuritic pain [SOB on Exertion] : no sob on exertion [Abdominal Pain] : no abdominal pain [Diarrhea] : no diarrhea [Headache] : no headache [Dizziness] : no dizziness

## 2021-09-17 NOTE — PHYSICAL EXAM
[No Acute Distress] : no acute distress [Supple] : supple [Normal Rate/Rhythm] : normal rate/rhythm [Normal S1, S2] : normal s1, s2 [No Resp Distress] : no resp distress [Clear to Auscultation Bilaterally] : clear to auscultation bilaterally [Not Tender] : not tender [Gait - Sufficient For Exercise Testing] : gait sufficient for exercise testing [Oriented x3] : oriented x3 [Normal Affect] : normal affect

## 2021-09-17 NOTE — ASSESSMENT
[FreeTextEntry1] : Data reviewed: \par cbc. bmp, coags done on 8-30-21 reviewed\par \par CT Chest without contrast 8-17-21: When compared to 12-, interval progression in the small and predominantly large airways inflammation with progression in cystic bronchiectasis. Nodular areas of  mucus plugging, more pronounced in the lingular, RML, LLL. \par \par Impression: \par Chronic, productive cough in the setting of bronchiectasis and DEDRICK.\par \par Plan: \par plan for bronch with BAL on 9-20-21. Risks, benefits and alternatives to the procedures discussed and all patient questions answered. \par patient ot get covid-19 testing done today\par Advised to use aerobika twice daily\par \par She will f/u with Dr. Gomes post procedure. \par \par \par

## 2021-09-17 NOTE — REASON FOR VISIT
[Follow-Up] : a follow-up visit [Bronchiectasis] : bronchiectasis [TextBox_44] : hemoptysis  [TextBox_13] : Dr. Gomes

## 2021-09-17 NOTE — HISTORY OF PRESENT ILLNESS
[TextBox_4] : 67 yo F with history of anxiety, bronchiectasis, Jack (not treated) referred by Dr. Barrett for bronchoscopy with BAL. Patient continue to have cough productive of thick, grey sputum with streaks of blood occasionally. Has been prescribed aerobika valve but does not use it. Ros positive for recent, unintentional minimal weight loss. \par \par

## 2021-09-20 ENCOUNTER — OUTPATIENT (OUTPATIENT)
Dept: OUTPATIENT SERVICES | Facility: HOSPITAL | Age: 66
LOS: 1 days | Discharge: HOME | End: 2021-09-20
Payer: COMMERCIAL

## 2021-09-20 ENCOUNTER — APPOINTMENT (OUTPATIENT)
Dept: PULMONOLOGY | Facility: CLINIC | Age: 66
End: 2021-09-20

## 2021-09-20 LAB
GRAM STN FLD: SIGNIFICANT CHANGE UP
SARS-COV-2 N GENE NPH QL NAA+PROBE: NOT DETECTED
SPECIMEN SOURCE: SIGNIFICANT CHANGE UP

## 2021-09-20 PROCEDURE — 31645 BRNCHSC W/THER ASPIR 1ST: CPT | Mod: GC

## 2021-09-20 PROCEDURE — 31624 DX BRONCHOSCOPE/LAVAGE: CPT | Mod: GC

## 2021-09-21 ENCOUNTER — RESULT REVIEW (OUTPATIENT)
Age: 66
End: 2021-09-21

## 2021-09-21 LAB
B PERT IGG+IGM PNL SER: SIGNIFICANT CHANGE UP
COLOR FLD: SIGNIFICANT CHANGE UP
FLUID INTAKE SUBSTANCE CLASS: SIGNIFICANT CHANGE UP
FLUID SEGMENTED GRANULOCYTES: 64 % — SIGNIFICANT CHANGE UP
LYMPHOCYTES # FLD: 13 % — SIGNIFICANT CHANGE UP
MONOS+MACROS # FLD: 23 % — SIGNIFICANT CHANGE UP
NIGHT BLUE STAIN TISS: SIGNIFICANT CHANGE UP
RCV VOL RI: 103 /UL — HIGH (ref 0–0)
SPECIMEN SOURCE FLD: SIGNIFICANT CHANGE UP
SPECIMEN SOURCE: SIGNIFICANT CHANGE UP
TOTAL NUCLEATED CELL COUNT, BODY FLUID: 1408 /UL — SIGNIFICANT CHANGE UP
TUBE TYPE: SIGNIFICANT CHANGE UP

## 2021-09-21 PROCEDURE — 88112 CYTOPATH CELL ENHANCE TECH: CPT | Mod: 26

## 2021-09-21 PROCEDURE — 87116 MYCOBACTERIA CULTURE: CPT

## 2021-09-21 PROCEDURE — 89051 BODY FLUID CELL COUNT: CPT

## 2021-09-21 PROCEDURE — 88305 TISSUE EXAM BY PATHOLOGIST: CPT | Mod: 26

## 2021-09-21 PROCEDURE — 31624 DX BRONCHOSCOPE/LAVAGE: CPT

## 2021-09-21 PROCEDURE — 87015 SPECIMEN INFECT AGNT CONCNTJ: CPT

## 2021-09-21 PROCEDURE — 88112 CYTOPATH CELL ENHANCE TECH: CPT

## 2021-09-21 PROCEDURE — 87206 SMEAR FLUORESCENT/ACID STAI: CPT

## 2021-09-21 PROCEDURE — 87102 FUNGUS ISOLATION CULTURE: CPT

## 2021-09-21 PROCEDURE — 88305 TISSUE EXAM BY PATHOLOGIST: CPT

## 2021-09-22 LAB
CULTURE RESULTS: NO GROWTH — SIGNIFICANT CHANGE UP
SPECIMEN SOURCE: SIGNIFICANT CHANGE UP

## 2021-09-23 LAB — NON-GYNECOLOGICAL CYTOLOGY STUDY: SIGNIFICANT CHANGE UP

## 2021-09-28 ENCOUNTER — NON-APPOINTMENT (OUTPATIENT)
Age: 66
End: 2021-09-28

## 2021-10-04 ENCOUNTER — NON-APPOINTMENT (OUTPATIENT)
Age: 66
End: 2021-10-04

## 2021-10-04 ENCOUNTER — APPOINTMENT (OUTPATIENT)
Dept: PULMONOLOGY | Facility: CLINIC | Age: 66
End: 2021-10-04
Payer: COMMERCIAL

## 2021-10-04 VITALS
BODY MASS INDEX: 20.77 KG/M2 | WEIGHT: 110 LBS | SYSTOLIC BLOOD PRESSURE: 124 MMHG | HEART RATE: 84 BPM | DIASTOLIC BLOOD PRESSURE: 77 MMHG | TEMPERATURE: 98.8 F | HEIGHT: 61 IN | RESPIRATION RATE: 12 BRPM | OXYGEN SATURATION: 98 %

## 2021-10-04 LAB — SARS-COV-2 N GENE NPH QL NAA+PROBE: NOT DETECTED

## 2021-10-04 PROCEDURE — 99214 OFFICE O/P EST MOD 30 MIN: CPT

## 2021-10-04 NOTE — ASSESSMENT
[FreeTextEntry1] : Had a long discussion with the patient and I reviewed the CT scan of the chest and compared to the previous 1.  The patient has new finding in the right lower left lower lobe and the lingula.  The patient had bronchoscopy which revealed copious mucus secretions but no airway abnormality.  The sputum culture was negative for bacterial infection.  Fungal infection are negative to date.  Mycobacterial culture were positive for DEDRICK.\par \par Patient improved with the antibiotic with resolution of her symptoms.\par \par Educated the patient on her condition details.  Explained to her that with improvement with the antibiotic her condition most likely related to exacerbation of bronchiectasis.  Whether the patient had underlying bacterial pneumonia versus bleeding infiltrate for DEDRICK.\par \par At this point I do not see any indication to start DEDRICK with 3 drugs with #6 history as the patient is clinically improved.  We will follow-up with CT scan of the chest in 3 months from the original once again to confirm the resolution of the infiltrate\par \par The patient is to contact me if there is any change in her condition.

## 2021-10-04 NOTE — REVIEW OF SYSTEMS
[Cough] : cough [Sputum] : sputum [Negative] : Endocrine [Hemoptysis] : no hemoptysis [Chest Tightness] : no chest tightness [Frequent URIs] : no frequent URIs [Dyspnea] : no dyspnea [Pleuritic Pain] : no pleuritic pain [A.M. Dry Mouth] : no a.m. dry mouth [SOB on Exertion] : no sob on exertion

## 2021-10-04 NOTE — HISTORY OF PRESENT ILLNESS
[TextBox_4] : she had bad experience after the bronchoscopy with fever, night sweets, coughing.  She is better after we started the antibiotic,  She is losing weight.  Her cough is better since today.  No coughing blood.  The cough is getting dry/  No wheezing.  Her appetite is better.  She is sleeping well.  No fever.

## 2021-10-20 LAB
CULTURE RESULTS: SIGNIFICANT CHANGE UP
SPECIMEN SOURCE: SIGNIFICANT CHANGE UP

## 2021-11-10 LAB
CULTURE RESULTS: SIGNIFICANT CHANGE UP
SPECIMEN SOURCE: SIGNIFICANT CHANGE UP

## 2021-11-17 ENCOUNTER — APPOINTMENT (OUTPATIENT)
Dept: INTERNAL MEDICINE | Facility: CLINIC | Age: 66
End: 2021-11-17
Payer: COMMERCIAL

## 2021-11-17 VITALS
DIASTOLIC BLOOD PRESSURE: 73 MMHG | HEIGHT: 61 IN | SYSTOLIC BLOOD PRESSURE: 112 MMHG | OXYGEN SATURATION: 99 % | RESPIRATION RATE: 14 BRPM | WEIGHT: 109 LBS | HEART RATE: 83 BPM | BODY MASS INDEX: 20.58 KG/M2 | TEMPERATURE: 98.3 F

## 2021-11-17 DIAGNOSIS — Z92.29 PERSONAL HISTORY OF OTHER DRUG THERAPY: ICD-10-CM

## 2021-11-17 PROCEDURE — 99397 PER PM REEVAL EST PAT 65+ YR: CPT | Mod: 25

## 2021-11-17 PROCEDURE — 36415 COLL VENOUS BLD VENIPUNCTURE: CPT

## 2021-11-17 NOTE — HEALTH RISK ASSESSMENT
[No] : No [No falls in past year] : Patient reported no falls in the past year [0] : 2) Feeling down, depressed, or hopeless: Not at all (0) [Patient reported PAP Smear was normal] : Patient reported PAP Smear was normal [] : No [[id number]] : 0 [MammogramComments] : Will get one soon [PapSmearDate] : 11/2021

## 2021-11-17 NOTE — HISTORY OF PRESENT ILLNESS
[FreeTextEntry1] : Interim reviewed; \par All consultants follow up noted\par Needs repeat CT of Chest  [de-identified] : As above;  Breathing has been good;\par Question of reoccurrence of DEDRICK; Reason for repeat CT\par Get third dose of of Covid vaccine;

## 2021-11-17 NOTE — ASSESSMENT
[FreeTextEntry1] : Exam is within normal limits;\par No change in current medication;\par All labs\par CT to be done;\par Mammogram

## 2021-11-20 ENCOUNTER — APPOINTMENT (OUTPATIENT)
Dept: CT IMAGING | Facility: HOSPITAL | Age: 66
End: 2021-11-20

## 2021-11-20 ENCOUNTER — OUTPATIENT (OUTPATIENT)
Dept: OUTPATIENT SERVICES | Facility: HOSPITAL | Age: 66
LOS: 1 days | End: 2021-11-20
Payer: COMMERCIAL

## 2021-11-20 PROCEDURE — 71250 CT THORAX DX C-: CPT

## 2021-11-20 PROCEDURE — 71250 CT THORAX DX C-: CPT | Mod: 26

## 2021-11-24 LAB
25(OH)D3 SERPL-MCNC: 68.8 NG/ML
ALBUMIN SERPL ELPH-MCNC: 4.7 G/DL
ALP BLD-CCNC: 77 U/L
ALT SERPL-CCNC: 30 U/L
ANION GAP SERPL CALC-SCNC: 13 MMOL/L
APPEARANCE: CLEAR
AST SERPL-CCNC: 31 U/L
BACTERIA: NEGATIVE
BASOPHILS # BLD AUTO: 0.02 K/UL
BASOPHILS NFR BLD AUTO: 0.3 %
BILIRUB SERPL-MCNC: 0.4 MG/DL
BILIRUBIN URINE: NEGATIVE
BLOOD URINE: ABNORMAL
BUN SERPL-MCNC: 12 MG/DL
CALCIUM SERPL-MCNC: 9.9 MG/DL
CHLORIDE SERPL-SCNC: 103 MMOL/L
CHOLEST SERPL-MCNC: 195 MG/DL
CO2 SERPL-SCNC: 26 MMOL/L
COLOR: YELLOW
CREAT SERPL-MCNC: 0.66 MG/DL
EOSINOPHIL # BLD AUTO: 0.04 K/UL
EOSINOPHIL NFR BLD AUTO: 0.6 %
ESTIMATED AVERAGE GLUCOSE: 117 MG/DL
GLUCOSE QUALITATIVE U: NEGATIVE
GLUCOSE SERPL-MCNC: 89 MG/DL
HBA1C MFR BLD HPLC: 5.7 %
HCT VFR BLD CALC: 42.2 %
HDLC SERPL-MCNC: 54 MG/DL
HGB BLD-MCNC: 13.1 G/DL
HYALINE CASTS: 1 /LPF
IMM GRANULOCYTES NFR BLD AUTO: 0.2 %
KETONES URINE: ABNORMAL
LDLC SERPL CALC-MCNC: 118 MG/DL
LEUKOCYTE ESTERASE URINE: ABNORMAL
LYMPHOCYTES # BLD AUTO: 2.58 K/UL
LYMPHOCYTES NFR BLD AUTO: 41.2 %
MAN DIFF?: NORMAL
MCHC RBC-ENTMCNC: 28.9 PG
MCHC RBC-ENTMCNC: 31 GM/DL
MCV RBC AUTO: 93 FL
MICROSCOPIC-UA: NORMAL
MONOCYTES # BLD AUTO: 0.68 K/UL
MONOCYTES NFR BLD AUTO: 10.9 %
NEUTROPHILS # BLD AUTO: 2.93 K/UL
NEUTROPHILS NFR BLD AUTO: 46.8 %
NITRITE URINE: NEGATIVE
NONHDLC SERPL-MCNC: 141 MG/DL
PH URINE: 6
PLATELET # BLD AUTO: 277 K/UL
POTASSIUM SERPL-SCNC: 4.8 MMOL/L
PROT SERPL-MCNC: 7.3 G/DL
PROTEIN URINE: NORMAL
RBC # BLD: 4.54 M/UL
RBC # FLD: 16 %
RED BLOOD CELLS URINE: 2 /HPF
SODIUM SERPL-SCNC: 141 MMOL/L
SPECIFIC GRAVITY URINE: 1.02
SQUAMOUS EPITHELIAL CELLS: 1 /HPF
T4 FREE SERPL-MCNC: 1.3 NG/DL
TRIGL SERPL-MCNC: 118 MG/DL
TSH SERPL-ACNC: 1.71 UIU/ML
UROBILINOGEN URINE: NORMAL
WBC # FLD AUTO: 6.26 K/UL
WHITE BLOOD CELLS URINE: 11 /HPF

## 2021-12-01 ENCOUNTER — APPOINTMENT (OUTPATIENT)
Dept: PULMONOLOGY | Facility: CLINIC | Age: 66
End: 2021-12-01
Payer: COMMERCIAL

## 2021-12-01 ENCOUNTER — APPOINTMENT (OUTPATIENT)
Dept: INTERNAL MEDICINE | Facility: CLINIC | Age: 66
End: 2021-12-01
Payer: COMMERCIAL

## 2021-12-01 VITALS
DIASTOLIC BLOOD PRESSURE: 69 MMHG | WEIGHT: 109 LBS | BODY MASS INDEX: 20.58 KG/M2 | OXYGEN SATURATION: 99 % | TEMPERATURE: 97.8 F | HEIGHT: 61 IN | HEART RATE: 82 BPM | SYSTOLIC BLOOD PRESSURE: 121 MMHG

## 2021-12-01 VITALS
HEIGHT: 61 IN | BODY MASS INDEX: 20.58 KG/M2 | TEMPERATURE: 97.9 F | HEART RATE: 75 BPM | SYSTOLIC BLOOD PRESSURE: 102 MMHG | DIASTOLIC BLOOD PRESSURE: 67 MMHG | WEIGHT: 109 LBS | RESPIRATION RATE: 12 BRPM | OXYGEN SATURATION: 97 %

## 2021-12-01 DIAGNOSIS — Z23 ENCOUNTER FOR IMMUNIZATION: ICD-10-CM

## 2021-12-01 PROCEDURE — 90662 IIV NO PRSV INCREASED AG IM: CPT

## 2021-12-01 PROCEDURE — 99214 OFFICE O/P EST MOD 30 MIN: CPT

## 2021-12-01 PROCEDURE — 90471 IMMUNIZATION ADMIN: CPT

## 2021-12-01 NOTE — ASSESSMENT
[FreeTextEntry1] : Bronchiectasis\par \par I discussed the case in details with the patient and reviewed the CT scan and the results of the previous bronchoscopy.  Cultures were negative for bacterial, fungal, and  positive for DEDRICK\par \par The patient is having no clinical evidence of underlying pulmonary infection.  She has good quality of life and nutritional status.  The patient might be colonized with DEDRICK but at this point there is no clinical evidence of underlying acute DEDRICK infection then this is a triple therapy for at least 1 month with high risk of side effects.\par \par The patient has adequate pulmonary toilet with exercise especially yoga and she has productive cough for about 1/2-hour after each time she exercise.  At this point I encouraged the patient to continue with exercise which precipitates adequate pulmonary toilet.  No indication for bronchodilators at this point.\par \par I informed the patient I will follow her clinically with no indication for any further treatment at this point.  The benefit of treating with triple therapy for DEDRICK at is less than the risk of side effects from the antibiotic.\par \par DEDRICK infection\par \par Observe for now\par \par Pulmonary nodules\par \par Most likely inflammatory in process and will follow up with repeat CT scan in 3 months\par \par The patient up-to-date with vaccination

## 2021-12-01 NOTE — HISTORY OF PRESENT ILLNESS
[TextBox_4] : she is coughing more and is more productive  She is exercising more especially yoga.  She starts coughing and is productive after 10 minutes from finishing yoga.  No fever, chills, nor coughing blood.  She ran 2 miles without no sob last time 10/27 but she pulled out muscle. Appetite is great  and sleep is also. She feels great in general

## 2021-12-07 ENCOUNTER — TRANSCRIPTION ENCOUNTER (OUTPATIENT)
Age: 66
End: 2021-12-07

## 2022-01-13 DIAGNOSIS — R92.8 OTHER ABNORMAL AND INCONCLUSIVE FINDINGS ON DIAGNOSTIC IMAGING OF BREAST: ICD-10-CM

## 2022-02-23 ENCOUNTER — APPOINTMENT (OUTPATIENT)
Dept: CT IMAGING | Facility: HOSPITAL | Age: 67
End: 2022-02-23
Payer: COMMERCIAL

## 2022-02-23 ENCOUNTER — OUTPATIENT (OUTPATIENT)
Dept: OUTPATIENT SERVICES | Facility: HOSPITAL | Age: 67
LOS: 1 days | End: 2022-02-23
Payer: COMMERCIAL

## 2022-02-23 PROCEDURE — 71250 CT THORAX DX C-: CPT

## 2022-02-23 PROCEDURE — 71250 CT THORAX DX C-: CPT | Mod: 26

## 2022-03-08 ENCOUNTER — APPOINTMENT (OUTPATIENT)
Dept: PULMONOLOGY | Facility: CLINIC | Age: 67
End: 2022-03-08
Payer: COMMERCIAL

## 2022-03-08 VITALS
SYSTOLIC BLOOD PRESSURE: 137 MMHG | WEIGHT: 114 LBS | DIASTOLIC BLOOD PRESSURE: 77 MMHG | OXYGEN SATURATION: 96 % | TEMPERATURE: 97 F | HEART RATE: 86 BPM | HEIGHT: 61 IN | BODY MASS INDEX: 21.52 KG/M2

## 2022-03-08 PROCEDURE — 99213 OFFICE O/P EST LOW 20 MIN: CPT

## 2022-03-08 NOTE — HISTORY OF PRESENT ILLNESS
[TextBox_4] : She has been doing well overall and no acute complaints. She has intermittent mucous and cough. Uses steam and airway clearance positions a few times per week and this works well with clearing mucus. Has not needed antibiotics. She is only using aerobika intermittenly. no fevers, chills, wheezing, shortness of breath, or hemoptysis. Appetite is good and weight gain which she is happy about. \par \par \par \par

## 2022-03-08 NOTE — ASSESSMENT
[FreeTextEntry1] : #Bronchiectasis\par #DEDRICK\par Prior bronch in 9/21 and cultures were negative for bacterial, fungal, and  positive for DEDRICK. Had a recent CT scan in 2/22 and much improved in inflammation and nodules and is currently asymptomatic. Pulmonary toilet is good and is consistent with clearance. No fevers, chills, hempoptysis. No indication for bronchodilators at this point or tx for DEDRICK given improvement. She will call with any sx or clinical changes. \par -continue pulm toilet and PEP device \par \par #Pulmonary nodules\par Most likely inflammatory in process and infectious process as now much improved from 2/22 CT. \par \par \par \par 
102

## 2022-06-10 ENCOUNTER — RX CHANGE (OUTPATIENT)
Age: 67
End: 2022-06-10

## 2022-06-10 RX ORDER — ROSUVASTATIN CALCIUM 10 MG/1
10 TABLET, FILM COATED ORAL
Qty: 30 | Refills: 5 | Status: DISCONTINUED | COMMUNITY
Start: 2018-09-13 | End: 2022-06-10

## 2022-08-18 ENCOUNTER — APPOINTMENT (OUTPATIENT)
Dept: INTERNAL MEDICINE | Facility: CLINIC | Age: 67
End: 2022-08-18

## 2022-08-18 VITALS
TEMPERATURE: 98.4 F | DIASTOLIC BLOOD PRESSURE: 79 MMHG | WEIGHT: 114 LBS | BODY MASS INDEX: 21.52 KG/M2 | HEART RATE: 87 BPM | HEIGHT: 61 IN | OXYGEN SATURATION: 99 % | SYSTOLIC BLOOD PRESSURE: 114 MMHG | RESPIRATION RATE: 14 BRPM

## 2022-08-18 DIAGNOSIS — Z01.818 ENCOUNTER FOR OTHER PREPROCEDURAL EXAMINATION: ICD-10-CM

## 2022-08-18 PROCEDURE — 99213 OFFICE O/P EST LOW 20 MIN: CPT

## 2022-08-18 NOTE — HEALTH RISK ASSESSMENT
[Former] : Former [No] : No [No falls in past year] : Patient reported no falls in the past year [0] : 2) Feeling down, depressed, or hopeless: Not at all (0) [[id number]] : 0

## 2022-08-18 NOTE — ASSESSMENT
[FreeTextEntry1] : Will discuss plans with Dr Gomes;\par Obtain labs; \par Further plans after review of studies

## 2022-08-18 NOTE — HISTORY OF PRESENT ILLNESS
[FreeTextEntry1] : Has increased sputum production \par yellow in color\par No significant shortness  of breath\par No hemoptysis  [de-identified] : In addition has lost weight 4 pounds

## 2022-08-19 ENCOUNTER — NON-APPOINTMENT (OUTPATIENT)
Age: 67
End: 2022-08-19

## 2022-08-19 LAB
25(OH)D3 SERPL-MCNC: 63.3 NG/ML
ABO + RH PNL BLD: NORMAL
ALBUMIN SERPL ELPH-MCNC: 4.8 G/DL
ALP BLD-CCNC: 81 U/L
ALT SERPL-CCNC: 16 U/L
ANION GAP SERPL CALC-SCNC: 13 MMOL/L
APPEARANCE: CLEAR
AST SERPL-CCNC: 22 U/L
BACTERIA: NEGATIVE
BASOPHILS # BLD AUTO: 0.03 K/UL
BASOPHILS NFR BLD AUTO: 0.4 %
BILIRUB SERPL-MCNC: 0.3 MG/DL
BILIRUBIN URINE: NEGATIVE
BLOOD URINE: NORMAL
BUN SERPL-MCNC: 14 MG/DL
CALCIUM SERPL-MCNC: 10.3 MG/DL
CHLORIDE SERPL-SCNC: 107 MMOL/L
CHOLEST SERPL-MCNC: 223 MG/DL
CO2 SERPL-SCNC: 26 MMOL/L
COLOR: YELLOW
CREAT SERPL-MCNC: 0.75 MG/DL
EGFR: 86 ML/MIN/1.73M2
EOSINOPHIL # BLD AUTO: 0.02 K/UL
EOSINOPHIL NFR BLD AUTO: 0.3 %
ESTIMATED AVERAGE GLUCOSE: 126 MG/DL
GLUCOSE QUALITATIVE U: NEGATIVE
GLUCOSE SERPL-MCNC: 91 MG/DL
HBA1C MFR BLD HPLC: 6 %
HCT VFR BLD CALC: 44.3 %
HDLC SERPL-MCNC: 73 MG/DL
HGB BLD-MCNC: 13.7 G/DL
HYALINE CASTS: 3 /LPF
IMM GRANULOCYTES NFR BLD AUTO: 0.3 %
KETONES URINE: NORMAL
LDLC SERPL CALC-MCNC: 136 MG/DL
LEUKOCYTE ESTERASE URINE: ABNORMAL
LYMPHOCYTES # BLD AUTO: 2.63 K/UL
LYMPHOCYTES NFR BLD AUTO: 35 %
MAN DIFF?: NORMAL
MCHC RBC-ENTMCNC: 29.2 PG
MCHC RBC-ENTMCNC: 30.9 GM/DL
MCV RBC AUTO: 94.5 FL
MICROSCOPIC-UA: NORMAL
MONOCYTES # BLD AUTO: 0.69 K/UL
MONOCYTES NFR BLD AUTO: 9.2 %
NEUTROPHILS # BLD AUTO: 4.12 K/UL
NEUTROPHILS NFR BLD AUTO: 54.8 %
NITRITE URINE: NEGATIVE
NONHDLC SERPL-MCNC: 149 MG/DL
PH URINE: 6
PLATELET # BLD AUTO: 253 K/UL
POTASSIUM SERPL-SCNC: 5.5 MMOL/L
PROT SERPL-MCNC: 7.7 G/DL
PROTEIN URINE: NORMAL
RBC # BLD: 4.69 M/UL
RBC # FLD: 14.5 %
RED BLOOD CELLS URINE: 7 /HPF
SODIUM SERPL-SCNC: 146 MMOL/L
SPECIFIC GRAVITY URINE: 1.03
SQUAMOUS EPITHELIAL CELLS: 2 /HPF
T4 FREE SERPL-MCNC: 1.3 NG/DL
TRIGL SERPL-MCNC: 67 MG/DL
TSH SERPL-ACNC: 1.76 UIU/ML
UROBILINOGEN URINE: NORMAL
WBC # FLD AUTO: 7.51 K/UL
WHITE BLOOD CELLS URINE: 22 /HPF

## 2022-08-23 ENCOUNTER — APPOINTMENT (OUTPATIENT)
Dept: PULMONOLOGY | Facility: CLINIC | Age: 67
End: 2022-08-23

## 2022-08-23 VITALS
BODY MASS INDEX: 20.96 KG/M2 | OXYGEN SATURATION: 94 % | SYSTOLIC BLOOD PRESSURE: 117 MMHG | WEIGHT: 111 LBS | DIASTOLIC BLOOD PRESSURE: 76 MMHG | HEART RATE: 87 BPM | HEIGHT: 61 IN

## 2022-08-23 DIAGNOSIS — R06.83 SNORING: ICD-10-CM

## 2022-08-23 PROCEDURE — 99213 OFFICE O/P EST LOW 20 MIN: CPT

## 2022-08-23 NOTE — REVIEW OF SYSTEMS
[Cough] : cough [Hemoptysis] : no hemoptysis [Chest Tightness] : no chest tightness [Frequent URIs] : no frequent URIs [Sputum] : no sputum [Dyspnea] : no dyspnea [Pleuritic Pain] : no pleuritic pain [Wheezing] : no wheezing [A.M. Dry Mouth] : no a.m. dry mouth [SOB on Exertion] : no sob on exertion [Negative] : Endocrine

## 2022-08-23 NOTE — HISTORY OF PRESENT ILLNESS
[Former] : former [Never] : never [TextBox_4] : She is doing very well.  She gets up with bouts of coughing with thick mucus no blood in it and about to happen about once every 2 weeks.  She is still feeling filling up with phlegm in the chest that she coughs and she clears for the next bout where within the next 2 weeks.  No fever no chills no loss of weight appetite is good his sleeping pattern is good

## 2022-08-23 NOTE — ASSESSMENT
[FreeTextEntry1] : Bronchiectasis\par \par I educated the patient on bronchiectasis.  I reviewed the CT scan of the chest with the patient and explained to her that she has extensive bronchiectasis with mucus impaction.  The cough is protected as she expectorating the retained sputum.  The patient patient is monitoring her sputum production.  Educated her about bronchiectasis and explained to her that to inform me if there is any change in the nature and the characteristics of the expectorated sputum.  There is no evidence of acute exacerbation at this point.\par \par Hemoptysis\par \par Resolved and no recurrence\par \par DEDRICK\par \par No indication for active infection at this point\par \par Pulmonary nodules\par \par Most likely related to DEDRICK/bronchiectasis/more airway disease no evidence of underlying malignancy.  She is scheduled for chest x-ray in February\par \par Snoring\par \par Sleep study was negative for sleep apnea\par \par PFT next visit

## 2022-11-15 ENCOUNTER — APPOINTMENT (OUTPATIENT)
Dept: INTERNAL MEDICINE | Facility: CLINIC | Age: 67
End: 2022-11-15

## 2022-11-15 ENCOUNTER — NON-APPOINTMENT (OUTPATIENT)
Age: 67
End: 2022-11-15

## 2022-11-15 VITALS
OXYGEN SATURATION: 98 % | HEIGHT: 61 IN | HEART RATE: 70 BPM | WEIGHT: 109 LBS | DIASTOLIC BLOOD PRESSURE: 74 MMHG | SYSTOLIC BLOOD PRESSURE: 126 MMHG | TEMPERATURE: 97.7 F | BODY MASS INDEX: 20.58 KG/M2

## 2022-11-15 PROCEDURE — G0008: CPT

## 2022-11-15 PROCEDURE — 99213 OFFICE O/P EST LOW 20 MIN: CPT | Mod: 25

## 2022-11-15 PROCEDURE — 90662 IIV NO PRSV INCREASED AG IM: CPT

## 2022-11-15 PROCEDURE — 36415 COLL VENOUS BLD VENIPUNCTURE: CPT

## 2022-11-15 NOTE — HISTORY OF PRESENT ILLNESS
[FreeTextEntry1] : Tingling back of legs\par Also arms [de-identified] : Respiratory status about the same\par Labs noticed from  last visit\par A1C increased;\par Lipid profile increased\par Exercise ; walking

## 2022-11-18 LAB
ALBUMIN SERPL ELPH-MCNC: 4.7 G/DL
ALP BLD-CCNC: 72 U/L
ALT SERPL-CCNC: 15 U/L
ANION GAP SERPL CALC-SCNC: 12 MMOL/L
AST SERPL-CCNC: 24 U/L
BASOPHILS # BLD AUTO: 0.02 K/UL
BASOPHILS NFR BLD AUTO: 0.4 %
BILIRUB SERPL-MCNC: 0.2 MG/DL
BUN SERPL-MCNC: 9 MG/DL
CALCIUM SERPL-MCNC: 10.1 MG/DL
CHLORIDE SERPL-SCNC: 105 MMOL/L
CHOLEST SERPL-MCNC: 188 MG/DL
CO2 SERPL-SCNC: 27 MMOL/L
CREAT SERPL-MCNC: 0.65 MG/DL
EGFR: 95 ML/MIN/1.73M2
EOSINOPHIL # BLD AUTO: 0.01 K/UL
EOSINOPHIL NFR BLD AUTO: 0.2 %
ESTIMATED AVERAGE GLUCOSE: 120 MG/DL
GLUCOSE SERPL-MCNC: 87 MG/DL
HBA1C MFR BLD HPLC: 5.8 %
HCT VFR BLD CALC: 43 %
HDLC SERPL-MCNC: 69 MG/DL
HGB BLD-MCNC: 13.5 G/DL
IMM GRANULOCYTES NFR BLD AUTO: 0.2 %
LDLC SERPL CALC-MCNC: 106 MG/DL
LYMPHOCYTES # BLD AUTO: 2.53 K/UL
LYMPHOCYTES NFR BLD AUTO: 44.3 %
MAN DIFF?: NORMAL
MCHC RBC-ENTMCNC: 29.4 PG
MCHC RBC-ENTMCNC: 31.4 GM/DL
MCV RBC AUTO: 93.7 FL
MONOCYTES # BLD AUTO: 0.53 K/UL
MONOCYTES NFR BLD AUTO: 9.3 %
NEUTROPHILS # BLD AUTO: 2.61 K/UL
NEUTROPHILS NFR BLD AUTO: 45.6 %
NONHDLC SERPL-MCNC: 119 MG/DL
PLATELET # BLD AUTO: 242 K/UL
POTASSIUM SERPL-SCNC: 5.5 MMOL/L
PROT SERPL-MCNC: 7.5 G/DL
RBC # BLD: 4.59 M/UL
RBC # FLD: 14.9 %
SODIUM SERPL-SCNC: 144 MMOL/L
T4 FREE SERPL-MCNC: 1.3 NG/DL
TRIGL SERPL-MCNC: 66 MG/DL
TSH SERPL-ACNC: 1.46 UIU/ML
WBC # FLD AUTO: 5.71 K/UL

## 2022-12-14 NOTE — END OF VISIT
Patient presents to the clinic today for a follow up with ADRI DIXON MD regarding Pain Management.    Elvin is a 60 year old who is being evaluated via a billable video visit.      How would you like to obtain your AVS? Mail a copy  If the video visit is dropped, the invitation should be resent by: Text to cell phone: [phone]  Will anyone else be joining your video visit? No        Video-Visit Details    Video Start Time:     Type of service:  Video Visit    Video End Time:    Originating Location (pt. Location): Home        Distant Location (provider location):  On-site    Platform used for Video Visit: Rylan        Is Pt currently in [place]? Yes  NOTE: If Pt is not in [place], Appointment needs to be canceled and rescheduled        [facility]-12.14.2021    QUESTIONS:    Ginette GAMBOA [facility] Patient Facilitator   [Time Spent: ___ minutes] : I have spent [unfilled] minutes of time on the encounter. [>50% of the face to face encounter time was spent on counseling and/or coordination of care for ___] : Greater than 50% of the face to face encounter time was spent on counseling and/or coordination of care for [unfilled]

## 2023-02-21 ENCOUNTER — OUTPATIENT (OUTPATIENT)
Dept: OUTPATIENT SERVICES | Facility: HOSPITAL | Age: 68
LOS: 1 days | End: 2023-02-21
Payer: COMMERCIAL

## 2023-02-21 ENCOUNTER — APPOINTMENT (OUTPATIENT)
Dept: PULMONOLOGY | Facility: CLINIC | Age: 68
End: 2023-02-21
Payer: COMMERCIAL

## 2023-02-21 VITALS
BODY MASS INDEX: 21.52 KG/M2 | HEIGHT: 61 IN | WEIGHT: 114 LBS | DIASTOLIC BLOOD PRESSURE: 87 MMHG | SYSTOLIC BLOOD PRESSURE: 128 MMHG | OXYGEN SATURATION: 95 % | HEART RATE: 78 BPM

## 2023-02-21 PROCEDURE — 71046 X-RAY EXAM CHEST 2 VIEWS: CPT

## 2023-02-21 PROCEDURE — 71046 X-RAY EXAM CHEST 2 VIEWS: CPT | Mod: 26

## 2023-02-21 PROCEDURE — ZZZZZ: CPT

## 2023-02-21 PROCEDURE — 94010 BREATHING CAPACITY TEST: CPT

## 2023-02-21 PROCEDURE — 94727 GAS DIL/WSHOT DETER LNG VOL: CPT

## 2023-02-21 PROCEDURE — 94729 DIFFUSING CAPACITY: CPT

## 2023-02-21 PROCEDURE — 99214 OFFICE O/P EST MOD 30 MIN: CPT | Mod: 25

## 2023-02-21 RX ORDER — CEFDINIR 300 MG/1
300 CAPSULE ORAL
Qty: 14 | Refills: 0 | Status: COMPLETED | COMMUNITY
Start: 2021-09-28 | End: 2023-02-21

## 2023-02-21 RX ORDER — HYDROCODONE BITARTRATE AND ACETAMINOPHEN 5; 325 MG/1; MG/1
5-325 TABLET ORAL
Qty: 10 | Refills: 0 | Status: DISCONTINUED | COMMUNITY
Start: 2021-04-06 | End: 2023-02-21

## 2023-02-21 RX ORDER — AMOXICILLIN 500 MG/1
500 CAPSULE ORAL
Qty: 21 | Refills: 0 | Status: DISCONTINUED | COMMUNITY
Start: 2021-04-06 | End: 2023-02-21

## 2023-02-21 NOTE — ASSESSMENT
[FreeTextEntry1] : Bronchiectasis  I discussed the case in detail with the patient.  Patient is stable with no evidence of underlying exacerbation of bronchiectasis.  The patient is not using any bronchodilator and normal saline.  She is not using the Aerobika.  The patient has adequate productive cough.  Indicated the patient on bronchiectasis and she is up-to-date with immunization.  The PFT was normal.  The flow cytometry of diffusion was slightly decreased compared to 2018 but echo be aging process could be also technique.  At this point continue current management.  Increase activity.  DEDRICK  No indication for treatment as a colonizer  Hemoptysis no recurrence.

## 2023-02-21 NOTE — HISTORY OF PRESENT ILLNESS
[Former] : former [Never] : never [TextBox_4] : she is doing OK.  She still has the productive cough sometimes small amount and sometimes large.  The majority of the times they are greyish and sometimes dark. NO coughing blood.  No wheezing.   She would feel it in the chest when it accumulates.  She gained 3 pounds appetite is good [ESS] : 0

## 2023-04-22 ENCOUNTER — RX RENEWAL (OUTPATIENT)
Age: 68
End: 2023-04-22

## 2023-06-05 NOTE — HEALTH RISK ASSESSMENT
Conveyed Dr. Cai's instructions and patient verbalized understanding. Updated prescription sent to pharmacy   [Never] : Never [No] : No [No falls in past year] : Patient reported no falls in the past year [0] : 2) Feeling down, depressed, or hopeless: Not at all (0)

## 2023-06-27 NOTE — PHYSICAL EXAM
[No Acute Distress] : no acute distress [Well Nourished] : well nourished [Well Developed] : well developed [Well-Appearing] : well-appearing [Normal Sclera/Conjunctiva] : normal sclera/conjunctiva [PERRL] : pupils equal round and reactive to light [EOMI] : extraocular movements intact [Normal Outer Ear/Nose] : the outer ears and nose were normal in appearance [Normal Oropharynx] : the oropharynx was normal [No JVD] : no jugular venous distention [No Lymphadenopathy] : no lymphadenopathy [Supple] : supple [Thyroid Normal, No Nodules] : the thyroid was normal and there were no nodules present [No Respiratory Distress] : no respiratory distress  [No Accessory Muscle Use] : no accessory muscle use [Clear to Auscultation] : lungs were clear to auscultation bilaterally [Normal Rate] : normal rate  [Regular Rhythm] : with a regular rhythm [Normal S1, S2] : normal S1 and S2 [No Murmur] : no murmur heard [No Carotid Bruits] : no carotid bruits [No Abdominal Bruit] : a ~M bruit was not heard ~T in the abdomen [No Varicosities] : no varicosities [Pedal Pulses Present] : the pedal pulses are present [No Edema] : there was no peripheral edema [No Palpable Aorta] : no palpable aorta [No Extremity Clubbing/Cyanosis] : no extremity clubbing/cyanosis [Soft] : abdomen soft [Non Tender] : non-tender [Non-distended] : non-distended [No Masses] : no abdominal mass palpated [No HSM] : no HSM [Normal Bowel Sounds] : normal bowel sounds [Normal Posterior Cervical Nodes] : no posterior cervical lymphadenopathy [Normal Anterior Cervical Nodes] : no anterior cervical lymphadenopathy (V5) oriented [No CVA Tenderness] : no CVA  tenderness [No Spinal Tenderness] : no spinal tenderness [No Joint Swelling] : no joint swelling [Grossly Normal Strength/Tone] : grossly normal strength/tone [No Rash] : no rash [Coordination Grossly Intact] : coordination grossly intact [No Focal Deficits] : no focal deficits [Normal Gait] : normal gait [Deep Tendon Reflexes (DTR)] : deep tendon reflexes were 2+ and symmetric [Normal Affect] : the affect was normal [Normal Insight/Judgement] : insight and judgment were intact

## 2023-08-17 ENCOUNTER — NON-APPOINTMENT (OUTPATIENT)
Age: 68
End: 2023-08-17

## 2023-08-18 ENCOUNTER — NON-APPOINTMENT (OUTPATIENT)
Age: 68
End: 2023-08-18

## 2023-08-21 ENCOUNTER — NON-APPOINTMENT (OUTPATIENT)
Age: 68
End: 2023-08-21

## 2023-09-07 ENCOUNTER — APPOINTMENT (OUTPATIENT)
Dept: PULMONOLOGY | Facility: CLINIC | Age: 68
End: 2023-09-07
Payer: COMMERCIAL

## 2023-09-07 VITALS
BODY MASS INDEX: 21.52 KG/M2 | HEART RATE: 67 BPM | OXYGEN SATURATION: 97 % | WEIGHT: 114 LBS | DIASTOLIC BLOOD PRESSURE: 68 MMHG | SYSTOLIC BLOOD PRESSURE: 122 MMHG | HEIGHT: 61 IN

## 2023-09-07 DIAGNOSIS — R91.1 SOLITARY PULMONARY NODULE: ICD-10-CM

## 2023-09-07 PROCEDURE — 99213 OFFICE O/P EST LOW 20 MIN: CPT

## 2023-09-07 NOTE — ASSESSMENT
[FreeTextEntry1] : Cactus is  Stable there is no evidence of exacerbation of bronchiectasis.  She is using the Aerobika.  She did not require the bronchodilator.  She has strong cough.  Hemoptysis  No recurrence  JAILYN  No evidence of active infection at this point  Pulmonary nodule  She is scheduled for chest x-ray and 2024 with PFT  Patient had a recent COVID infection.  The pain is musculoskeletal related to the cough.  The patient did not have any exacerbation of bronchiectasis with the infection.  At this point the patient is stable baseline oxygen saturation.

## 2023-09-07 NOTE — HISTORY OF PRESENT ILLNESS
[TextBox_4] : She had COVID in August.  She was coughing.  She did not have a fever but went to urgent care and given a Plex COVID.  She was coughing and had pain and soreness and tightness in the chest.  They gave her Proventil but did not use it.  She was coughing the yellow secretion.  No coughing blood.  But she is back to normal and she was walking yesterday with no difficulty

## 2023-12-12 ENCOUNTER — APPOINTMENT (OUTPATIENT)
Dept: INTERNAL MEDICINE | Facility: CLINIC | Age: 68
End: 2023-12-12
Payer: COMMERCIAL

## 2023-12-12 PROCEDURE — G0008: CPT

## 2023-12-12 PROCEDURE — 36415 COLL VENOUS BLD VENIPUNCTURE: CPT

## 2023-12-12 PROCEDURE — 90662 IIV NO PRSV INCREASED AG IM: CPT

## 2024-01-31 ENCOUNTER — NON-APPOINTMENT (OUTPATIENT)
Age: 69
End: 2024-01-31

## 2024-01-31 ENCOUNTER — APPOINTMENT (OUTPATIENT)
Dept: INTERNAL MEDICINE | Facility: CLINIC | Age: 69
End: 2024-01-31
Payer: COMMERCIAL

## 2024-01-31 VITALS
HEIGHT: 61.02 IN | BODY MASS INDEX: 21.41 KG/M2 | DIASTOLIC BLOOD PRESSURE: 72 MMHG | WEIGHT: 113.38 LBS | SYSTOLIC BLOOD PRESSURE: 148 MMHG | TEMPERATURE: 98.1 F | OXYGEN SATURATION: 97 % | HEART RATE: 85 BPM

## 2024-01-31 DIAGNOSIS — Z23 ENCOUNTER FOR IMMUNIZATION: ICD-10-CM

## 2024-01-31 DIAGNOSIS — Z86.39 PERSONAL HISTORY OF OTHER ENDOCRINE, NUTRITIONAL AND METABOLIC DISEASE: ICD-10-CM

## 2024-01-31 DIAGNOSIS — R04.2 HEMOPTYSIS: ICD-10-CM

## 2024-01-31 DIAGNOSIS — R09.82 POSTNASAL DRIP: ICD-10-CM

## 2024-01-31 DIAGNOSIS — Z00.00 ENCOUNTER FOR GENERAL ADULT MEDICAL EXAMINATION W/OUT ABNORMAL FINDINGS: ICD-10-CM

## 2024-01-31 DIAGNOSIS — R93.89 ABNORMAL FINDINGS ON DIAGNOSTIC IMAGING OF OTHER SPECIFIED BODY STRUCTURES: ICD-10-CM

## 2024-01-31 PROCEDURE — 99397 PER PM REEVAL EST PAT 65+ YR: CPT | Mod: 25

## 2024-01-31 PROCEDURE — 36415 COLL VENOUS BLD VENIPUNCTURE: CPT

## 2024-01-31 RX ORDER — ALPRAZOLAM 0.5 MG/1
0.5 TABLET ORAL
Qty: 30 | Refills: 0 | Status: ACTIVE | COMMUNITY
Start: 1900-01-01 | End: 1900-01-01

## 2024-01-31 NOTE — ASSESSMENT
[FreeTextEntry1] : Stable examination Will order.   EKG noted Sinus  Has Bunions: Podiatry follow up  All labs

## 2024-01-31 NOTE — HISTORY OF PRESENT ILLNESS
[FreeTextEntry1] : Interim reviewed Dr Gomes follow up noted   [de-identified] : Shortness of breath minimal

## 2024-01-31 NOTE — HEALTH RISK ASSESSMENT
[Fair] :  ~his/her~ mood as fair [No] : No [No falls in past year] : Patient reported no falls in the past year [0] : 2) Feeling down, depressed, or hopeless: Not at all (0) [Patient reported mammogram was normal] : Patient reported mammogram was normal [Former] : Former [[id number]] : 0 [MammogramDate] : 01/2024 [PapSmearComments] : gyn not recently  [BoneDensityComments] : Will order [ColonoscopyComments] : Dr White

## 2024-02-06 LAB
25(OH)D3 SERPL-MCNC: 73.8 NG/ML
ALBUMIN SERPL ELPH-MCNC: 5.1 G/DL
ALP BLD-CCNC: 86 U/L
ALT SERPL-CCNC: 13 U/L
ANION GAP SERPL CALC-SCNC: 12 MMOL/L
APPEARANCE: CLEAR
AST SERPL-CCNC: 24 U/L
BACTERIA: NEGATIVE /HPF
BILIRUB SERPL-MCNC: 0.3 MG/DL
BILIRUBIN URINE: NEGATIVE
BLOOD URINE: ABNORMAL
BUN SERPL-MCNC: 13 MG/DL
CALCIUM SERPL-MCNC: 10.1 MG/DL
CAST: 0 /LPF
CHLORIDE SERPL-SCNC: 102 MMOL/L
CHOLEST SERPL-MCNC: 211 MG/DL
CO2 SERPL-SCNC: 26 MMOL/L
COLOR: YELLOW
CREAT SERPL-MCNC: 0.62 MG/DL
EGFR: 96 ML/MIN/1.73M2
EPITHELIAL CELLS: 1 /HPF
ESTIMATED AVERAGE GLUCOSE: 117 MG/DL
GLUCOSE QUALITATIVE U: NEGATIVE MG/DL
GLUCOSE SERPL-MCNC: 88 MG/DL
HBA1C MFR BLD HPLC: 5.7 %
HCT VFR BLD CALC: 43.8 %
HDLC SERPL-MCNC: 81 MG/DL
HGB BLD-MCNC: 13.9 G/DL
KETONES URINE: 15 MG/DL
LDLC SERPL CALC-MCNC: 120 MG/DL
LEUKOCYTE ESTERASE URINE: ABNORMAL
MCHC RBC-ENTMCNC: 29 PG
MCHC RBC-ENTMCNC: 31.7 GM/DL
MCV RBC AUTO: 91.4 FL
MICROSCOPIC-UA: NORMAL
NITRITE URINE: NEGATIVE
NONHDLC SERPL-MCNC: 130 MG/DL
PH URINE: 6
PLATELET # BLD AUTO: 237 K/UL
POTASSIUM SERPL-SCNC: 5.4 MMOL/L
PROT SERPL-MCNC: 7.6 G/DL
PROTEIN URINE: NEGATIVE MG/DL
RBC # BLD: 4.79 M/UL
RBC # FLD: 14.5 %
RED BLOOD CELLS URINE: 0 /HPF
SODIUM SERPL-SCNC: 141 MMOL/L
SPECIFIC GRAVITY URINE: 1.01
T4 FREE SERPL-MCNC: 1.3 NG/DL
TRIGL SERPL-MCNC: 56 MG/DL
TSH SERPL-ACNC: 1.71 UIU/ML
UROBILINOGEN URINE: 0.2 MG/DL
WBC # FLD AUTO: 7.86 K/UL
WHITE BLOOD CELLS URINE: 5 /HPF

## 2024-03-05 ENCOUNTER — NON-APPOINTMENT (OUTPATIENT)
Age: 69
End: 2024-03-05

## 2024-03-08 PROBLEM — A31.0 MAI (MYCOBACTERIUM AVIUM-INTRACELLULARE) INFECTION: Status: ACTIVE | Noted: 2021-10-04

## 2024-03-08 PROBLEM — J47.9 BRONCHIECTASIS: Status: ACTIVE | Noted: 2018-09-13

## 2024-03-08 PROBLEM — R06.02 SOB (SHORTNESS OF BREATH): Status: ACTIVE | Noted: 2018-10-31

## 2024-03-08 NOTE — REASON FOR VISIT
[Initial] : an initial visit [Abnormal CXR/ Chest CT] : an abnormal CXR/ chest CT [Bronchiectasis] : bronchiectasis

## 2024-03-08 NOTE — PHYSICAL EXAM
[No Acute Distress] : no acute distress [Normal Appearance] : normal appearance [Normal Oropharynx] : normal oropharynx [Normal Rate/Rhythm] : normal rate/rhythm [No Neck Mass] : no neck mass [No Murmurs] : no murmurs [Normal S1, S2] : normal s1, s2 [Clear to Auscultation Bilaterally] : clear to auscultation bilaterally [No Resp Distress] : no resp distress [No Abnormalities] : no abnormalities [Benign] : benign [No Clubbing] : no clubbing [Normal Gait] : normal gait [No Edema] : no edema [No Cyanosis] : no cyanosis [FROM] : FROM [Normal Color/ Pigmentation] : normal color/ pigmentation [Oriented x3] : oriented x3 [No Focal Deficits] : no focal deficits [Normal Affect] : normal affect

## 2024-03-12 ENCOUNTER — APPOINTMENT (OUTPATIENT)
Dept: PULMONOLOGY | Facility: CLINIC | Age: 69
End: 2024-03-12
Payer: COMMERCIAL

## 2024-03-12 VITALS
TEMPERATURE: 97.88 F | HEART RATE: 82 BPM | SYSTOLIC BLOOD PRESSURE: 110 MMHG | WEIGHT: 115 LBS | BODY MASS INDEX: 21.71 KG/M2 | HEIGHT: 61 IN | OXYGEN SATURATION: 99 % | DIASTOLIC BLOOD PRESSURE: 73 MMHG

## 2024-03-12 DIAGNOSIS — R06.02 SHORTNESS OF BREATH: ICD-10-CM

## 2024-03-12 DIAGNOSIS — A31.0 PULMONARY MYCOBACTERIAL INFECTION: ICD-10-CM

## 2024-03-12 DIAGNOSIS — J47.9 BRONCHIECTASIS, UNCOMPLICATED: ICD-10-CM

## 2024-03-12 PROCEDURE — 99215 OFFICE O/P EST HI 40 MIN: CPT

## 2024-03-12 RX ORDER — SODIUM CHLORIDE FOR INHALATION 7 %
7 VIAL, NEBULIZER (ML) INHALATION
Qty: 1 | Refills: 2 | Status: ACTIVE | COMMUNITY
Start: 2024-03-12 | End: 1900-01-01

## 2024-03-12 NOTE — HISTORY OF PRESENT ILLNESS
[Former] : former [< 20 pack-years] : < 20 pack-years [TextBox_4] : 68 year old female, pmhx high cholesterol, uterine fibroids, bronchiectasis former smoker, < 1/2 ppd x 15 years, stopped > 30 years ago she was first diagnosed with bronchiectasis in 2016. She kept getting colds and bronchial issues. She had a chest x-ray, then chest CT She brings up mucus She infrequently needs antibiotics She has the aerobika, not using often She tested positive for COVID in August 2023. Was given albuterol, but didn't need it She denies shortness of breath, no difficulty on flat ground or going up stairs. She does the stair master, donte Weight is stable She denies fevers. At times gets night sweats, once every few months. It is not to the point she has to change her clothes She was told she had DEDRICK

## 2024-03-12 NOTE — PROCEDURE
[FreeTextEntry1] :   CT 2022 Impression: Since 2021, there has been improvement in nodular areas of consolidation bilaterally, consistent with improvement in infection  PFT 23 FVC: 2.70 L (101%) FEV1: 2.09 L (104%) FEV1/FVC: 78% FEF 25-75%: 1.81 L/s (102%) T.47 L (101%) RV/T% DLCO: 14.99 (77%)

## 2024-03-12 NOTE — DISCUSSION/SUMMARY
[FreeTextEntry1] : ATTENDING'S SUMMARY: 3-12-24: no pallor, no icterus no JVD, no hepatojugular reflux, no HSM no cervical adenopathy, no supraclavicular adenopathy normal s1/s2, no murmurs, rubs or gallops good air entry bilaterally, no wheezing, rhonchi or crackles no cyanosis, no clubbing, no articular manifestations, no thickening of the skin no pedal edema

## 2024-03-12 NOTE — ASSESSMENT
[FreeTextEntry1] : 3-12-24:  It was a pleasure to meet Justa today in consultation. Her respiratory issues are summarized:  1. Bronchiectasis Diagnosed in 2016. Reviewed chest x-ray and CT reviewed from Feb 2022. On the CT, there are a few nodules, dilated airways. In the lingula, there are areas of haziness, dilated airways. There is evidence of inspissated mucus. She has had two bronchoscopies, most recently in 2021. There was growth of DEDRICK. She has never been treated. She brings up mucus on a daily basis.  Recommend: - use Aerobika several times per day - begin hypertonic saline twice a day via nebulizer - send sputum cultures looking for AFB and bacteria - PFT, 6MWT before appt with Dr. Sosa - Continue regular aerobic exercise at least 5 days per week  2. Rule out collagen vascular disease Justa's mother has rheumatoid arthritis. Given airways disease, we will evaluate autoimmune serologies looking for rheumatoid arthritis, Sjogren's and MCTD.  3. Pulmonary nodules Nodules on CT are likely 2/2 to mucus, however she has a family history of lung cancer (father) and remote smoking history, so she is at a higher risk of developing lung cancer. She will obtain images from R done recently so we can review with prior imaging.  Follow up next visit with Dr. Sosa

## 2024-03-12 NOTE — ADDENDUM
[FreeTextEntry1] :    I, Dr. Didier Mauricio, personally performed the evaluation and management (E/M) services for this established patient who presents today with (a) new problem(s)/exacerbation of (an) existing condition(s). That E/M includes conducting the clinically appropriate interval history &/or exam, assessing all new/exacerbated conditions, and establishing a new plan of care. Today, my JOE, Ms. Rosamaria Pedersen was here to observe my evaluation and management service for this new problem/exacerbated condition and follow the plan of care established by me going forward.

## 2024-03-13 ENCOUNTER — RX RENEWAL (OUTPATIENT)
Age: 69
End: 2024-03-13

## 2024-03-13 RX ORDER — ROSUVASTATIN CALCIUM 10 MG/1
10 TABLET, FILM COATED ORAL
Qty: 30 | Refills: 8 | Status: ACTIVE | COMMUNITY
Start: 2022-06-10 | End: 1900-01-01

## 2024-03-15 ENCOUNTER — LABORATORY RESULT (OUTPATIENT)
Age: 69
End: 2024-03-15

## 2024-03-17 LAB
BACTERIA SPT CULT: ABNORMAL
BACTERIA SPT CULT: ABNORMAL
ENA RNP AB SER IA-ACNC: <0.2 AL
ENA SM AB SER IA-ACNC: <0.2 AL
ENA SS-A AB SER IA-ACNC: <0.2 AL
ENA SS-B AB SER IA-ACNC: <0.2 AL

## 2024-03-18 LAB
C4 SERPL-MCNC: 53 MG/DL
CH50 SERPL-MCNC: 53 U/ML

## 2024-03-19 ENCOUNTER — NON-APPOINTMENT (OUTPATIENT)
Age: 69
End: 2024-03-19

## 2024-03-19 RX ORDER — AZITHROMYCIN 500 MG/1
500 TABLET, FILM COATED ORAL DAILY
Qty: 7 | Refills: 0 | Status: ACTIVE | COMMUNITY
Start: 2024-03-19 | End: 1900-01-01

## 2024-03-21 LAB
ANTI-BETA2 GLYCOPROTEIN 1 IGG CONCENTRATION: 2 U/ML
ANTI-BETA2 GLYCOPROTEIN 1 IGM CONCENTRATION: <2.4 U/ML
ANTI-CARDIOLIPIN IGG CONCENTRATION: 2 GPL
ANTI-CARDIOLIPIN IGM CONCENTRATION: <0.9 MPL
ANTI-CENP IGG CONCENTRATION: 0 U/ML
ANTI-CYCLIC CITRULLINATED PEPTIDE IGG CONCENTRATION: 2 U/ML
ANTI-DOUBLE-STRANDED DNA IGG CONCENTRATION: 22 IU/ML
ANTI-JO-1 IGG CONCENTRATION: <0.3 U/ML
ANTI-NUCLEAR ANTIBODIES - CYTOPLASMIC PATTERN: NORMAL
ANTI-NUCLEAR ANTIBODIES - PRIMARY NUCLEAR PATTERN: NORMAL
ANTI-NUCLEAR ANTIBODIES - PRIMARY PATTERN TITER: NEGATIVE
ANTI-NUCLEAR ANTIBODIES IGG CONCENTRATION: 12 UNITS
ANTI-RNA POL III IGG CONCENTRATION: <0.7 U/ML
ANTI-RNP70 IGG CONCENTRATION: 4 U/ML
ANTI-RO52 IGG CONCENTRATION: <0.3 U/ML
ANTI-RO60 IGG CONCENTRATION: <0.4 U/ML
ANTI-SCL-70 IGG CONCENTRATION: 1 U/ML
ANTI-SMITH IGG CONCENTRATION: <0.7 U/ML
ANTI-SS-B (LA) IGG CONCENTRATION: 1 U/ML
ANTI-THYROGLOBULIN IGG CONCENTRATION: <12 IU/ML
ANTI-THYROID PEROXIDASE IGG CONCENTRATION: 8 IU/ML
ANTI-U1RNP IGG CONCENTRATION: 2 U/ML
AVISE LUPUS RESULT: NORMAL
B-LYMPHOCYTE-BOUND C4D (BC4D) LEVEL: NORMAL
ERYTHROCYTE-BOUND C4D (EC4D) LEVEL: 5
RHEUMATOID FACTOR (IGA) CONCENTRATION: 7 IU/ML
RHEUMATOID FACTOR (IGM) CONCENTRATION: 1 IU/ML

## 2024-04-04 LAB
EJ AB SER QL: NEGATIVE
ENA JO1 AB SER IA-ACNC: <20 UNITS
ENA PM/SCL AB SER-ACNC: <20 UNITS
ENA SM+RNP AB SER IA-ACNC: <20 UNITS
ENA SS-A IGG SER QL: <20 UNITS
FIBRILLARIN AB SER QL: NEGATIVE
KU AB SER QL: NEGATIVE
MDA-5 (P140)(CADM-140): <20 UNITS
MI2 AB SER QL: NEGATIVE
NXP-2 (P140): <20 UNITS
OJ AB SER QL: NEGATIVE
PL12 AB SER QL: NEGATIVE
PL7 AB SER QL: NEGATIVE
SRP AB SERPL QL: NEGATIVE
TIF GAMMA (P155/140): <20 UNITS
U2 SNRNP AB SER QL: NEGATIVE

## 2024-04-11 ENCOUNTER — NON-APPOINTMENT (OUTPATIENT)
Age: 69
End: 2024-04-11

## 2024-04-17 ENCOUNTER — APPOINTMENT (OUTPATIENT)
Dept: PULMONOLOGY | Facility: CLINIC | Age: 69
End: 2024-04-17
Payer: COMMERCIAL

## 2024-04-17 VITALS
SYSTOLIC BLOOD PRESSURE: 123 MMHG | HEIGHT: 61 IN | HEART RATE: 77 BPM | WEIGHT: 115 LBS | BODY MASS INDEX: 21.71 KG/M2 | TEMPERATURE: 97.9 F | OXYGEN SATURATION: 97 % | DIASTOLIC BLOOD PRESSURE: 78 MMHG

## 2024-04-17 PROCEDURE — ZZZZZ: CPT

## 2024-04-17 PROCEDURE — 94729 DIFFUSING CAPACITY: CPT

## 2024-04-17 PROCEDURE — 94060 EVALUATION OF WHEEZING: CPT

## 2024-04-17 PROCEDURE — 94727 GAS DIL/WSHOT DETER LNG VOL: CPT

## 2024-04-17 PROCEDURE — 95012 NITRIC OXIDE EXP GAS DETER: CPT

## 2024-04-17 PROCEDURE — 99215 OFFICE O/P EST HI 40 MIN: CPT | Mod: 25

## 2024-04-18 NOTE — RESULTS/DATA
[TextEntry] : RADIOLOGY CT chest with contrast performed at [facility] radiology on March 6, 2024 Impression: Increased bronchiectasis and fibrosis in the lingula.  Increased patchy clusters of bronchiectasis and tree-in-bud nodules throughout both lungs.  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  Follow-up chest CT in 6 to 12 months is recommended.   PFT 4/17/2024 FVC: 2.97 L, 113% predicted FEV1: 2.1 L, 106% predicted FEV1/FVC 71% TLC_N24.40 8 L 101% predicted DLCO: 14.36 units, 74% predicted 6-minute walk distance: 576 m Pre and post SpO2 above 97% on room air Katia score dyspnea 0 before and after the test Impression: Normal spirometry, lung volumes, diffusing capacity and 6-minute walk test 4/17/24  Katy: 15 ppb   EKG / ECHO      MICRO 3/15/2024   AFB smear neg Mycobacterium avium isolated Numerous Haemophilus parainfluenzae  09- BAL Lingular Moderate Acid fast bacillus seen by fluorochrome stain Few Mycobacterium avium complex by Dna Probe  10/8/20 BAL RML Few Acid fast bacillus seen by fluorochrome stain. Moderate Mycobacterium avium complex by Dna Probe   PATH    OTHER LABS OF NOTE 3/13/24:  Negative Abs to the following:  Masters and RNP  Sjogren's  YADIEL ANCA Double-stranded DNA Erythrocyte bound C4d B lymphocyte bound C4d SCL 70 CENP   .  Mildly elevated C4 at 53 mg per deciliter.

## 2024-04-18 NOTE — ASSESSMENT
[FreeTextEntry1] : 68 year old female, former smoker (quit 1993, 12-15 yrs of < 1/2 ppd), pmhx high cholesterol, uterine fibroids, bronchiectasis   #   BRONCHIECTASIS. Dx in 2016 Etiology: idiopathic Workup: negative CTD panel (3/15/24) Associated conditions: chronic nasal congestion, GERD Microbial colonization: DEDRICK, H parainfluenza Symptoms: cough p/o mucus; no dyspnea Exacerbations in the past 2 years: 1 Hospitalizations for bronchiectasis exacerbation in the last 2 years: none BMI: 21.7 (4/17/24) - unchanged for ~ 5 yrs mMRC score: 1 PFT: FEV1 106% predicted (4/17/2024) Radiologic severity/number of lobes involved: 3 lobes (RML, lingula. LLL) BSI: 6 (moderate) THERAPIES: -Airway clearance regimen: hypertonic saline (not using Aerobika); laying on back with legs elevated (helps!) -Antimicrobial therapy: rec'd a course of Azithro in March 2024 - Anti-inflammatory: none Vaccinations: RECOMMENDATIONS: --continue 7% saline twice a day --practice kee coughing --encouraged to use Aerobika twice a day --encouraged exercise  # NTM LD:  Smear negative M avium 3/15/24 in sputum Smear positive M avium in 2020 and 2021 from [facility] RML and [facility] Lingula respectively Non-cavitary No hemoptysis (had once years ago) No weight loss, night sweat or fatigues --discussed indications for treatment with the pt and do not believe anticrobial rx is indicated at this time
Awake

## 2024-04-18 NOTE — PHYSICAL EXAM
[No Acute Distress] : no acute distress [Normal Oropharynx] : normal oropharynx [Normal Appearance] : normal appearance [No Neck Mass] : no neck mass [Normal Rate/Rhythm] : normal rate/rhythm [Normal S1, S2] : normal s1, s2 [No Murmurs] : no murmurs [No Resp Distress] : no resp distress [Clear to Auscultation Bilaterally] : clear to auscultation bilaterally [No Abnormalities] : no abnormalities [Benign] : benign [Normal Gait] : normal gait [No Clubbing] : no clubbing [No Cyanosis] : no cyanosis [No Edema] : no edema [FROM] : FROM [Normal Color/ Pigmentation] : normal color/ pigmentation [No Focal Deficits] : no focal deficits [Oriented x3] : oriented x3 [Normal Affect] : normal affect [TextEntry] : Gen: Pleasant thin woman in no distress HEENT: Sclera non-icteric, conjunctiva non-injected; oropharynx clear w/o thrush;  Neck: supple w/o cervical LAD; no bruits CV: Regular rate and rhythm, no murmurs, rubs or gallops Lungs: CTAB, no rales, wheezes, squeaks or rhonchi Extremities: no clubbing, cyanosis or edema Skin: no rash Psych: normal mood and affect Neuro:  non-focal

## 2024-04-18 NOTE — HISTORY OF PRESENT ILLNESS
[Former] : former [< 20 pack-years] : < 20 pack-years [TextBox_4] : 68 year old female, former smoker (quit 1993, 12-15 yrs of < 1/2 ppd), pmhx high cholesterol, uterine fibroids, bronchiectasis former smoker, < 1/2 ppd x 15 years, stopped > 30 years ago she was first diagnosed with bronchiectasis in 2016. She kept getting colds and bronchial issues. She had a chest x-ray, then chest CT She brings up mucus She infrequently needs antibiotics She has the aerobika, not using often She tested positive for COVID in August 2023. Was given albuterol, but didn't need it She denies shortness of breath, no difficulty on flat ground or going up stairs. She does the stair master, donte Weight is stable She denies fevers. At times gets night sweats, once every few months. It is not to the point she has to change her clothes She was told she had DEDRICK Childhood hx: premie  Initial visit with me on 4/17/24: current airway clearance regimen: 7% saline twice a day - started several weeks ago --> mucus coming out haven't used the Aerobia Current sx:  cough with sputum of various colors RUTH only with strenuous exercise (mMRC 0) wheezing - when mucus accumulates occasional chest tightness - same as above; rare chronic nasal congestion - chronic but better lately' + post-nasal drip nasal d/c heartburn/regurg - depens on diet; once in the past week; no meds pain/stiffness in left wrist excessive eye dryness, but not mouth dryness Abx for resp infection: 1 in past 2 yrs (several weeks ago) No hemoptysis since 2017

## 2024-04-18 NOTE — IMMUNIZATIONS
[TextEntry] : Vaccination history: Last COVID: 2022 Last flu: Fall 2023 RSV: never Last pneumococcal / type: Prevnar 13 and PVX 23 ~ 2020

## 2024-05-02 LAB — ACID FAST STN SPT: ABNORMAL

## 2024-06-13 ENCOUNTER — NON-APPOINTMENT (OUTPATIENT)
Age: 69
End: 2024-06-13

## 2024-06-13 LAB — ACID FAST STN SPT: ABNORMAL

## 2024-06-24 LAB — BACTERIA SPT CULT: NORMAL

## 2024-06-29 LAB — ACID FAST STN SPT: NORMAL

## 2024-07-17 ENCOUNTER — APPOINTMENT (OUTPATIENT)
Dept: PULMONOLOGY | Facility: CLINIC | Age: 69
End: 2024-07-17
Payer: COMMERCIAL

## 2024-07-17 ENCOUNTER — APPOINTMENT (OUTPATIENT)
Dept: PULMONOLOGY | Facility: CLINIC | Age: 69
End: 2024-07-17

## 2024-07-17 VITALS
SYSTOLIC BLOOD PRESSURE: 119 MMHG | WEIGHT: 116 LBS | HEIGHT: 61 IN | HEART RATE: 77 BPM | BODY MASS INDEX: 21.9 KG/M2 | OXYGEN SATURATION: 97 % | TEMPERATURE: 97.1 F | DIASTOLIC BLOOD PRESSURE: 77 MMHG

## 2024-07-17 PROCEDURE — ZZZZZ: CPT

## 2024-07-17 PROCEDURE — 99214 OFFICE O/P EST MOD 30 MIN: CPT | Mod: 25

## 2024-07-17 PROCEDURE — 94664 DEMO&/EVAL PT USE INHALER: CPT

## 2024-07-17 RX ORDER — SODIUM CHLORIDE SOLN NEBU 7% 7 %
7 NEBU SOLN INHALATION
Qty: 1 | Refills: 5 | Status: ACTIVE | COMMUNITY
Start: 2024-07-17 | End: 1900-01-01

## 2024-07-17 RX ORDER — ALBUTEROL SULFATE 90 UG/1
108 (90 BASE) AEROSOL, METERED RESPIRATORY (INHALATION)
Qty: 1 | Refills: 5 | Status: ACTIVE | COMMUNITY
Start: 2024-07-17 | End: 1900-01-01

## 2024-07-30 ENCOUNTER — APPOINTMENT (OUTPATIENT)
Dept: PULMONOLOGY | Facility: CLINIC | Age: 69
End: 2024-07-30

## 2024-10-10 ENCOUNTER — APPOINTMENT (OUTPATIENT)
Dept: PULMONOLOGY | Facility: CLINIC | Age: 69
End: 2024-10-10
Payer: COMMERCIAL

## 2024-10-10 VITALS
TEMPERATURE: 98.1 F | HEIGHT: 61 IN | HEART RATE: 88 BPM | SYSTOLIC BLOOD PRESSURE: 122 MMHG | WEIGHT: 116 LBS | OXYGEN SATURATION: 97 % | BODY MASS INDEX: 21.9 KG/M2 | DIASTOLIC BLOOD PRESSURE: 83 MMHG

## 2024-10-10 PROCEDURE — 99213 OFFICE O/P EST LOW 20 MIN: CPT

## 2024-10-20 LAB — BACTERIA SPT CULT: ABNORMAL

## 2024-10-25 LAB — ACID FAST STN SPT: ABNORMAL

## 2024-10-26 LAB — FUNGUS SPT CULT: NORMAL

## 2024-11-06 ENCOUNTER — APPOINTMENT (OUTPATIENT)
Dept: PULMONOLOGY | Facility: CLINIC | Age: 69
End: 2024-11-06

## 2024-11-06 VITALS
BODY MASS INDEX: 21.9 KG/M2 | SYSTOLIC BLOOD PRESSURE: 121 MMHG | WEIGHT: 116 LBS | HEIGHT: 61 IN | OXYGEN SATURATION: 98 % | DIASTOLIC BLOOD PRESSURE: 74 MMHG | HEART RATE: 80 BPM

## 2024-11-06 DIAGNOSIS — A49.8 OTHER BACTERIAL INFECTIONS OF UNSPECIFIED SITE: ICD-10-CM

## 2024-11-06 PROCEDURE — G0008: CPT

## 2024-11-06 PROCEDURE — 99214 OFFICE O/P EST MOD 30 MIN: CPT | Mod: 25

## 2024-11-06 PROCEDURE — 90656 IIV3 VACC NO PRSV 0.5 ML IM: CPT

## 2024-11-06 RX ORDER — MINOCYCLINE HYDROCHLORIDE 100 MG/1
100 TABLET ORAL
Qty: 64 | Refills: 0 | Status: ACTIVE | COMMUNITY
Start: 2024-11-06 | End: 1900-01-01

## 2024-11-18 ENCOUNTER — APPOINTMENT (OUTPATIENT)
Dept: PULMONOLOGY | Facility: CLINIC | Age: 69
End: 2024-11-18
Payer: COMMERCIAL

## 2024-11-18 VITALS
HEART RATE: 68 BPM | HEIGHT: 61 IN | OXYGEN SATURATION: 95 % | WEIGHT: 117.13 LBS | BODY MASS INDEX: 22.11 KG/M2 | DIASTOLIC BLOOD PRESSURE: 72 MMHG | TEMPERATURE: 97.8 F | SYSTOLIC BLOOD PRESSURE: 134 MMHG

## 2024-11-18 DIAGNOSIS — R93.89 ABNORMAL FINDINGS ON DIAGNOSTIC IMAGING OF OTHER SPECIFIED BODY STRUCTURES: ICD-10-CM

## 2024-11-18 DIAGNOSIS — A31.0 PULMONARY MYCOBACTERIAL INFECTION: ICD-10-CM

## 2024-11-18 DIAGNOSIS — Z87.891 PERSONAL HISTORY OF NICOTINE DEPENDENCE: ICD-10-CM

## 2024-11-18 DIAGNOSIS — J47.9 BRONCHIECTASIS, UNCOMPLICATED: ICD-10-CM

## 2024-11-18 DIAGNOSIS — R09.82 POSTNASAL DRIP: ICD-10-CM

## 2024-11-18 PROCEDURE — 99214 OFFICE O/P EST MOD 30 MIN: CPT | Mod: GC

## 2024-11-18 PROCEDURE — G2211 COMPLEX E/M VISIT ADD ON: CPT | Mod: NC

## 2024-12-20 ENCOUNTER — RX RENEWAL (OUTPATIENT)
Age: 69
End: 2024-12-20

## 2025-01-29 ENCOUNTER — APPOINTMENT (OUTPATIENT)
Dept: PULMONOLOGY | Facility: CLINIC | Age: 70
End: 2025-01-29
Payer: COMMERCIAL

## 2025-01-29 ENCOUNTER — NON-APPOINTMENT (OUTPATIENT)
Age: 70
End: 2025-01-29

## 2025-01-29 VITALS
BODY MASS INDEX: 21.52 KG/M2 | HEIGHT: 61 IN | SYSTOLIC BLOOD PRESSURE: 115 MMHG | WEIGHT: 114 LBS | HEART RATE: 83 BPM | DIASTOLIC BLOOD PRESSURE: 73 MMHG | TEMPERATURE: 97.1 F | OXYGEN SATURATION: 97 %

## 2025-01-29 PROCEDURE — 99214 OFFICE O/P EST MOD 30 MIN: CPT

## 2025-02-03 ENCOUNTER — LABORATORY RESULT (OUTPATIENT)
Age: 70
End: 2025-02-03

## 2025-02-03 ENCOUNTER — APPOINTMENT (OUTPATIENT)
Dept: INTERNAL MEDICINE | Facility: CLINIC | Age: 70
End: 2025-02-03
Payer: COMMERCIAL

## 2025-02-03 VITALS
OXYGEN SATURATION: 97 % | SYSTOLIC BLOOD PRESSURE: 131 MMHG | WEIGHT: 113 LBS | HEIGHT: 60.63 IN | HEART RATE: 79 BPM | BODY MASS INDEX: 21.61 KG/M2 | DIASTOLIC BLOOD PRESSURE: 79 MMHG | TEMPERATURE: 98.2 F

## 2025-02-03 DIAGNOSIS — Z86.39 PERSONAL HISTORY OF OTHER ENDOCRINE, NUTRITIONAL AND METABOLIC DISEASE: ICD-10-CM

## 2025-02-03 DIAGNOSIS — R60.0 LOCALIZED EDEMA: ICD-10-CM

## 2025-02-03 DIAGNOSIS — R04.2 HEMOPTYSIS: ICD-10-CM

## 2025-02-03 DIAGNOSIS — J47.9 BRONCHIECTASIS, UNCOMPLICATED: ICD-10-CM

## 2025-02-03 DIAGNOSIS — A31.0 PULMONARY MYCOBACTERIAL INFECTION: ICD-10-CM

## 2025-02-03 DIAGNOSIS — R06.02 SHORTNESS OF BREATH: ICD-10-CM

## 2025-02-03 PROCEDURE — 99213 OFFICE O/P EST LOW 20 MIN: CPT | Mod: 25

## 2025-02-03 PROCEDURE — 36415 COLL VENOUS BLD VENIPUNCTURE: CPT

## 2025-02-04 LAB
ALBUMIN SERPL ELPH-MCNC: 4.7 G/DL
ALP BLD-CCNC: 76 U/L
ALT SERPL-CCNC: 13 U/L
ANION GAP SERPL CALC-SCNC: 14 MMOL/L
APPEARANCE: CLEAR
AST SERPL-CCNC: 22 U/L
BILIRUB SERPL-MCNC: 0.2 MG/DL
BILIRUBIN URINE: NEGATIVE
BLOOD URINE: ABNORMAL
BUN SERPL-MCNC: 13 MG/DL
CALCIUM SERPL-MCNC: 9.8 MG/DL
CHLORIDE SERPL-SCNC: 103 MMOL/L
CHOLEST SERPL-MCNC: 215 MG/DL
CO2 SERPL-SCNC: 24 MMOL/L
COLOR: YELLOW
CREAT SERPL-MCNC: 0.64 MG/DL
EGFR: 94 ML/MIN/1.73M2
ESTIMATED AVERAGE GLUCOSE: 117 MG/DL
GLUCOSE QUALITATIVE U: NEGATIVE MG/DL
GLUCOSE SERPL-MCNC: 93 MG/DL
HBA1C MFR BLD HPLC: 5.7 %
HCT VFR BLD CALC: 41.9 %
HDLC SERPL-MCNC: 78 MG/DL
HGB BLD-MCNC: 13 G/DL
KETONES URINE: 15 MG/DL
LDLC SERPL CALC-MCNC: 125 MG/DL
LEUKOCYTE ESTERASE URINE: ABNORMAL
MCHC RBC-ENTMCNC: 29 PG
MCHC RBC-ENTMCNC: 31 G/DL
MCV RBC AUTO: 93.3 FL
NITRITE URINE: NEGATIVE
NONHDLC SERPL-MCNC: 136 MG/DL
PH URINE: 5.5
PLATELET # BLD AUTO: 253 K/UL
POTASSIUM SERPL-SCNC: 4.3 MMOL/L
PROT SERPL-MCNC: 7.5 G/DL
PROTEIN URINE: NEGATIVE MG/DL
RBC # BLD: 4.49 M/UL
RBC # FLD: 15.6 %
SODIUM SERPL-SCNC: 141 MMOL/L
SPECIFIC GRAVITY URINE: 1.02
T4 FREE SERPL-MCNC: 1.3 NG/DL
TRIGL SERPL-MCNC: 61 MG/DL
TSH SERPL-ACNC: 1.37 UIU/ML
UROBILINOGEN URINE: 0.2 MG/DL
WBC # FLD AUTO: 7.6 K/UL

## 2025-04-04 ENCOUNTER — APPOINTMENT (OUTPATIENT)
Dept: PULMONOLOGY | Facility: CLINIC | Age: 70
End: 2025-04-04
Payer: COMMERCIAL

## 2025-04-04 VITALS
OXYGEN SATURATION: 96 % | WEIGHT: 113.6 LBS | HEIGHT: 61 IN | HEART RATE: 82 BPM | SYSTOLIC BLOOD PRESSURE: 161 MMHG | TEMPERATURE: 97.8 F | DIASTOLIC BLOOD PRESSURE: 84 MMHG | BODY MASS INDEX: 21.45 KG/M2

## 2025-04-04 DIAGNOSIS — J47.9 BRONCHIECTASIS, UNCOMPLICATED: ICD-10-CM

## 2025-04-04 PROCEDURE — 99214 OFFICE O/P EST MOD 30 MIN: CPT

## 2025-04-04 PROCEDURE — G2211 COMPLEX E/M VISIT ADD ON: CPT | Mod: NC

## 2025-04-18 ENCOUNTER — NON-APPOINTMENT (OUTPATIENT)
Age: 70
End: 2025-04-18

## 2025-07-18 ENCOUNTER — APPOINTMENT (OUTPATIENT)
Dept: PULMONOLOGY | Facility: CLINIC | Age: 70
End: 2025-07-18

## 2025-07-28 ENCOUNTER — APPOINTMENT (OUTPATIENT)
Dept: PULMONOLOGY | Facility: CLINIC | Age: 70
End: 2025-07-28
Payer: COMMERCIAL

## 2025-07-28 VITALS
OXYGEN SATURATION: 98 % | WEIGHT: 114 LBS | HEART RATE: 97 BPM | BODY MASS INDEX: 21.52 KG/M2 | HEIGHT: 61 IN | DIASTOLIC BLOOD PRESSURE: 80 MMHG | TEMPERATURE: 98.3 F | SYSTOLIC BLOOD PRESSURE: 121 MMHG

## 2025-07-28 PROCEDURE — 99213 OFFICE O/P EST LOW 20 MIN: CPT

## 2025-07-28 PROCEDURE — G2211 COMPLEX E/M VISIT ADD ON: CPT | Mod: NC

## 2025-08-07 ENCOUNTER — LABORATORY RESULT (OUTPATIENT)
Age: 70
End: 2025-08-07

## 2025-08-09 LAB — BACTERIA SPT CULT: NORMAL

## 2025-08-12 LAB — ACID FAST STN SPT: ABNORMAL

## 2025-08-16 LAB — FUNGUS SPT CULT: NORMAL
